# Patient Record
Sex: MALE | Race: BLACK OR AFRICAN AMERICAN | Employment: FULL TIME | URBAN - METROPOLITAN AREA
[De-identification: names, ages, dates, MRNs, and addresses within clinical notes are randomized per-mention and may not be internally consistent; named-entity substitution may affect disease eponyms.]

---

## 2017-01-11 ENCOUNTER — OFFICE VISIT (OUTPATIENT)
Dept: INTERNAL MEDICINE CLINIC | Age: 52
End: 2017-01-11

## 2017-01-11 VITALS
DIASTOLIC BLOOD PRESSURE: 80 MMHG | HEIGHT: 67 IN | SYSTOLIC BLOOD PRESSURE: 128 MMHG | WEIGHT: 173 LBS | OXYGEN SATURATION: 98 % | BODY MASS INDEX: 27.15 KG/M2 | RESPIRATION RATE: 16 BRPM | HEART RATE: 61 BPM | TEMPERATURE: 98.6 F

## 2017-01-11 DIAGNOSIS — I10 ESSENTIAL HYPERTENSION, BENIGN: ICD-10-CM

## 2017-01-11 DIAGNOSIS — L72.3 SEBACEOUS CYST: Primary | ICD-10-CM

## 2017-01-11 RX ORDER — SULFAMETHOXAZOLE AND TRIMETHOPRIM 800; 160 MG/1; MG/1
TABLET ORAL
Qty: 14 TAB | Refills: 0 | Status: SHIPPED | OUTPATIENT
Start: 2017-01-11 | End: 2017-04-27

## 2017-01-11 NOTE — PROGRESS NOTES
1. Have you been to the ER, urgent care clinic since your last visit? Hospitalized since your last visit? No    2. Have you seen or consulted any other health care providers outside of the [address] [place] [address] since your last visit? Include any pap smears or colon screening.  No

## 2017-01-11 NOTE — PROGRESS NOTES
HPI:   Pimple like lesion back x 2 weeks with mild pain but no fever or drainage    ROS is otherwise negative. Past Medical History   Diagnosis Date    Esophagitis     HTN (hypertension) 2/2016    IGT (impaired glucose tolerance)        Past Surgical History   Procedure Laterality Date    Hx colonoscopy  8/2015     polyps       Social History     Social History    Marital status:      Spouse name: N/A    Number of children: N/A    Years of education: N/A     Occupational History    Not on file. Social History Main Topics    Smoking status: Current Every Day Smoker     Packs/day: 0.25    Smokeless tobacco: Not on file    Alcohol use Yes      Comment: socially    Drug use: Not on file    Sexual activity: Not on file     Other Topics Concern    Not on file     Social History Narrative       No Known Allergies    Family History   Problem Relation Age of Onset    Arthritis-osteo Father        Current Outpatient Prescriptions   Medication Sig Dispense Refill    amLODIPine (NORVASC) 5 mg tablet Take 1 Tab by mouth daily. 30 Tab 5    omeprazole (PRILOSEC) 20 mg capsule Take 20 mg by mouth daily.  sildenafil citrate (VIAGRA) 100 mg tablet Take 100 mg by mouth as needed. Visit Vitals    /80 (BP 1 Location: Left arm, BP Patient Position: Sitting)    Pulse 61    Temp 98.6 °F (37 °C) (Tympanic)    Resp 16    Ht 5' 7\" (1.702 m)    Wt 173 lb (78.5 kg)    SpO2 98%    BMI 27.1 kg/m2       PE  Well nourished in NAD  HEENT:   OP: clear. Neck: supple w/o mass or bruits. Chest: clear. CV: RRR w/o m,r,g; pulses intact. Abd: soft, NT, w/o HSM or mass. Ext: w/o edema. Back: 1x2 cm indurated cystic lesion mid central back with minimal drainage  Neuro: NF. Assessment and Plan    Encounter Diagnoses   Name Primary?     Sebaceous cyst Yes    Essential hypertension, benign    Infected sebaceous cyst (back)  Bactrim DS 1 bid for 7 days  Keep clean with soap/water  F/U worsening or unimproved 7 days  HTN - controlled  Discussed the patient's above normal BMI with him. I have recommended the following interventions: dietary management education, guidance, and counseling . The BMI follow up plan is as follows: BMI is out of normal parameters and plan is as follows: I have counseled this patient on diet and exercise regimens   The patient was counseled on the dangers of tobacco use, and was advised to quit. Reviewed strategies to maximize success, including removing cigarettes and smoking materials from environment.   OV 6 mos or prn  I have explained plan to patient and the patient verbalizes understanding

## 2017-01-11 NOTE — PATIENT INSTRUCTIONS

## 2017-04-26 RX ORDER — AMLODIPINE BESYLATE 5 MG/1
5 TABLET ORAL DAILY
Qty: 30 TAB | Refills: 5 | Status: SHIPPED | OUTPATIENT
Start: 2017-04-26 | End: 2018-06-01 | Stop reason: SDUPTHER

## 2017-04-26 NOTE — TELEPHONE ENCOUNTER
Requested Prescriptions     Pending Prescriptions Disp Refills    amLODIPine (NORVASC) 5 mg tablet 30 Tab 5     Sig: Take 1 Tab by mouth daily.

## 2017-05-09 ENCOUNTER — OFFICE VISIT (OUTPATIENT)
Dept: INTERNAL MEDICINE CLINIC | Age: 52
End: 2017-05-09

## 2017-05-09 VITALS
HEART RATE: 69 BPM | TEMPERATURE: 97.8 F | RESPIRATION RATE: 16 BRPM | HEIGHT: 67 IN | BODY MASS INDEX: 26.84 KG/M2 | WEIGHT: 171 LBS | DIASTOLIC BLOOD PRESSURE: 80 MMHG | OXYGEN SATURATION: 98 % | SYSTOLIC BLOOD PRESSURE: 136 MMHG

## 2017-05-09 DIAGNOSIS — I10 ESSENTIAL HYPERTENSION, BENIGN: ICD-10-CM

## 2017-05-09 DIAGNOSIS — Z00.00 ROUTINE GENERAL MEDICAL EXAMINATION AT A HEALTH CARE FACILITY: Primary | ICD-10-CM

## 2017-05-09 NOTE — PATIENT INSTRUCTIONS

## 2017-05-09 NOTE — PROGRESS NOTES
HPI:   Routine check up  Hx notable for HTN  w/o chest pain/abd. discomfort; no dyspnea, cough or pedal edema; denies constitutional complaints of fever, night sweats or wt loss; no evidence of GI/ hemorrhage; no polyuria/polydipsia. Activity is age appropriate. ROS is otherwise negative. Past Medical History:   Diagnosis Date    Esophagitis     HTN (hypertension) 2/2016    IGT (impaired glucose tolerance)        Past Surgical History:   Procedure Laterality Date    HX COLONOSCOPY  8/2015    polyps       Social History     Social History    Marital status:      Spouse name: N/A    Number of children: N/A    Years of education: N/A     Occupational History    Not on file. Social History Main Topics    Smoking status: Current Every Day Smoker     Packs/day: 0.25    Smokeless tobacco: Not on file    Alcohol use Yes      Comment: socially    Drug use: Not on file    Sexual activity: Not on file     Other Topics Concern    Not on file     Social History Narrative       No Known Allergies    Family History   Problem Relation Age of Onset    Arthritis-osteo Father        Current Outpatient Prescriptions   Medication Sig Dispense Refill    amLODIPine (NORVASC) 5 mg tablet Take 1 Tab by mouth daily. 30 Tab 5    omeprazole (PRILOSEC) 20 mg capsule Take 20 mg by mouth daily as needed.  sildenafil citrate (VIAGRA) 100 mg tablet Take 100 mg by mouth as needed. Visit Vitals    /80    Pulse 69    Temp 97.8 °F (36.6 °C) (Tympanic)    Resp 16    Ht 5' 7\" (1.702 m)    Wt 171 lb (77.6 kg)    SpO2 98%    BMI 26.78 kg/m2       PE  Well nourished in NAD  HEENT: OP: clear. Neck: supple w/o mass or bruits. Chest: clear. CV: RRR w/o m,r,g; pulses intact. Abd: soft, NT, w/o HSM or mass. Rectal: heme neg; prostate 3 FBS and smooth  Ext: w/o edema. Neuro: NF.  ECG: [id number]    Assessment and Plan    Encounter Diagnoses   Name Primary?     Routine general medical examination at a health care facility Yes    Essential hypertension, benign    HTN - controlled  Labs soon to assess metabolic parameters  [place] done 8/2015 (polyp)  I have reviewed/discussed the above normal BMI with the patient. I have recommended the following interventions: dietary management education, guidance, and counseling . The patient was counseled on the dangers of tobacco use, and was advised to quit. Reviewed strategies to maximize success, including removing cigarettes and smoking materials from environment.   No change in rx  OV 6 mos or prn  I have explained plan to patient and the patient verbalizes understanding

## 2017-05-11 ENCOUNTER — LAB ONLY (OUTPATIENT)
Dept: INTERNAL MEDICINE CLINIC | Age: 52
End: 2017-05-11

## 2017-05-11 ENCOUNTER — HOSPITAL ENCOUNTER (OUTPATIENT)
Dept: LAB | Age: 52
Discharge: HOME | End: 2017-05-11
Payer: COMMERCIAL

## 2017-05-11 DIAGNOSIS — Z00.00 ROUTINE GENERAL MEDICAL EXAMINATION AT A HEALTH CARE FACILITY: ICD-10-CM

## 2017-05-11 DIAGNOSIS — I10 ESSENTIAL HYPERTENSION, BENIGN: ICD-10-CM

## 2017-05-11 LAB
ALBUMIN SERPL BCP-MCNC: 4 G/DL (ref 3.4–5)
ALBUMIN/GLOB SERPL: 1.1 {RATIO} (ref 0.8–1.7)
ALP SERPL-CCNC: 115 U/L (ref 45–117)
ALT SERPL-CCNC: 33 U/L (ref 16–61)
ANION GAP BLD CALC-SCNC: 9 MMOL/L (ref 3–18)
AST SERPL W P-5'-P-CCNC: 25 U/L (ref 15–37)
BASOPHILS # BLD AUTO: 0 K/UL (ref 0–0.06)
BASOPHILS # BLD: 0 % (ref 0–2)
BILIRUB SERPL-MCNC: 0.7 MG/DL (ref 0.2–1)
BUN SERPL-MCNC: 12 MG/DL (ref 7–18)
BUN/CREAT SERPL: 10 (ref 12–20)
CALCIUM SERPL-MCNC: 9.4 MG/DL (ref 8.5–10.1)
CHLORIDE SERPL-SCNC: 105 MMOL/L (ref 100–108)
CHOLEST SERPL-MCNC: 158 MG/DL
CO2 SERPL-SCNC: 27 MMOL/L (ref 21–32)
CREAT SERPL-MCNC: 1.16 MG/DL (ref 0.6–1.3)
DIFFERENTIAL METHOD BLD: ABNORMAL
EOSINOPHIL # BLD: 0.7 K/UL (ref 0–0.4)
EOSINOPHIL NFR BLD: 11 % (ref 0–5)
ERYTHROCYTE [DISTWIDTH] IN BLOOD BY AUTOMATED COUNT: 13.9 % (ref 11.6–14.5)
GLOBULIN SER CALC-MCNC: 3.6 G/DL (ref 2–4)
GLUCOSE SERPL-MCNC: 91 MG/DL (ref 74–99)
HCT VFR BLD AUTO: 53.1 % (ref 36–48)
HDLC SERPL-MCNC: 53 MG/DL (ref 40–60)
HDLC SERPL: 3 {RATIO} (ref 0–5)
HGB BLD-MCNC: 19 G/DL (ref 13–16)
LDLC SERPL CALC-MCNC: 79.4 MG/DL (ref 0–100)
LIPID PROFILE,FLP: NORMAL
LYMPHOCYTES # BLD AUTO: 32 % (ref 21–52)
LYMPHOCYTES # BLD: 2 K/UL (ref 0.9–3.6)
MCH RBC QN AUTO: 32.3 PG (ref 24–34)
MCHC RBC AUTO-ENTMCNC: 35.8 G/DL (ref 31–37)
MCV RBC AUTO: 90.3 FL (ref 74–97)
MONOCYTES # BLD: 0.4 K/UL (ref 0.05–1.2)
MONOCYTES NFR BLD AUTO: 6 % (ref 3–10)
NEUTS SEG # BLD: 3.2 K/UL (ref 1.8–8)
NEUTS SEG NFR BLD AUTO: 51 % (ref 40–73)
PLATELET # BLD AUTO: 188 K/UL (ref 135–420)
PMV BLD AUTO: 10.9 FL (ref 9.2–11.8)
POTASSIUM SERPL-SCNC: 4.4 MMOL/L (ref 3.5–5.5)
PROT SERPL-MCNC: 7.6 G/DL (ref 6.4–8.2)
PSA SERPL-MCNC: 0.7 NG/ML (ref 0–4)
RBC # BLD AUTO: 5.88 M/UL (ref 4.7–5.5)
SODIUM SERPL-SCNC: 141 MMOL/L (ref 136–145)
TRIGL SERPL-MCNC: 128 MG/DL (ref ?–150)
VLDLC SERPL CALC-MCNC: 25.6 MG/DL
WBC # BLD AUTO: 6.2 K/UL (ref 4.6–13.2)

## 2017-05-11 PROCEDURE — 80061 LIPID PANEL: CPT | Performed by: INTERNAL MEDICINE

## 2017-05-11 PROCEDURE — 85025 COMPLETE CBC W/AUTO DIFF WBC: CPT | Performed by: INTERNAL MEDICINE

## 2017-05-11 PROCEDURE — 80053 COMPREHEN METABOLIC PANEL: CPT | Performed by: INTERNAL MEDICINE

## 2017-05-11 PROCEDURE — 84153 ASSAY OF PSA TOTAL: CPT | Performed by: INTERNAL MEDICINE

## 2018-01-31 ENCOUNTER — HOSPITAL ENCOUNTER (OUTPATIENT)
Dept: LAB | Age: 53
Discharge: HOME | End: 2018-01-31
Payer: COMMERCIAL

## 2018-01-31 ENCOUNTER — OFFICE VISIT (OUTPATIENT)
Dept: INTERNAL MEDICINE CLINIC | Age: 53
End: 2018-01-31

## 2018-01-31 VITALS
SYSTOLIC BLOOD PRESSURE: 134 MMHG | DIASTOLIC BLOOD PRESSURE: 80 MMHG | RESPIRATION RATE: 16 BRPM | WEIGHT: 177 LBS | BODY MASS INDEX: 27.78 KG/M2 | HEIGHT: 67 IN | TEMPERATURE: 99 F | HEART RATE: 56 BPM | OXYGEN SATURATION: 95 %

## 2018-01-31 DIAGNOSIS — Z23 ENCOUNTER FOR IMMUNIZATION: ICD-10-CM

## 2018-01-31 DIAGNOSIS — I10 ESSENTIAL HYPERTENSION, BENIGN: ICD-10-CM

## 2018-01-31 DIAGNOSIS — N34.2 URETHRITIS: ICD-10-CM

## 2018-01-31 DIAGNOSIS — N34.2 URETHRITIS: Primary | ICD-10-CM

## 2018-01-31 PROCEDURE — 87491 CHLMYD TRACH DNA AMP PROBE: CPT | Performed by: INTERNAL MEDICINE

## 2018-01-31 RX ORDER — CEFTRIAXONE 250 MG/8ML
150 INJECTION, POWDER, FOR SOLUTION INTRAMUSCULAR; INTRAVENOUS ONCE
Qty: 150 MG | Refills: 0
Start: 2018-01-31 | End: 2018-01-31

## 2018-01-31 RX ORDER — DOXYCYCLINE 100 MG/1
TABLET ORAL
Qty: 14 TAB | Refills: 0 | Status: SHIPPED | OUTPATIENT
Start: 2018-01-31 | End: 2018-03-12

## 2018-01-31 NOTE — PROGRESS NOTES
Beulah Duggan presents today for Ceftriaxone 125 mg  injection. Allergies reviewed. Injection given in right gluteus per Dr Ramirez Chimera verbal order, order read back and confirmed Ceftriaxone 125 mg x 1 dose. Patient tolerated injection well was observed in office for 15 mins and left office ambulatory.

## 2018-01-31 NOTE — PATIENT INSTRUCTIONS
DASH Diet: Care Instructions  Your Care Instructions    The DASH diet is an eating plan that can help lower your blood pressure. DASH stands for Dietary Approaches to Stop Hypertension. Hypertension is high blood pressure. The DASH diet focuses on eating foods that are high in calcium, potassium, and magnesium. These nutrients can lower blood pressure. The foods that are highest in these nutrients are fruits, vegetables, low-fat dairy products, nuts, seeds, and legumes. But taking calcium, potassium, and magnesium supplements instead of eating foods that are high in those nutrients does not have the same effect. The DASH diet also includes whole grains, fish, and poultry. The DASH diet is one of several lifestyle changes your doctor may recommend to lower your high blood pressure. Your doctor may also want you to decrease the amount of sodium in your diet. Lowering sodium while following the DASH diet can lower blood pressure even further than just the DASH diet alone. Follow-up care is a key part of your treatment and safety. Be sure to make and go to all appointments, and call your doctor if you are having problems. It's also a good idea to know your test results and keep a list of the medicines you take. How can you care for yourself at home? Following the DASH diet  · Eat 4 to 5 servings of fruit each day. A serving is 1 medium-sized piece of fruit, ½ cup chopped or canned fruit, 1/4 cup dried fruit, or 4 ounces (½ cup) of fruit juice. Choose fruit more often than fruit juice. · Eat 4 to 5 servings of vegetables each day. A serving is 1 cup of lettuce or raw leafy vegetables, ½ cup of chopped or cooked vegetables, or 4 ounces (½ cup) of vegetable juice. Choose vegetables more often than vegetable juice. · Get 2 to 3 servings of low-fat and fat-free dairy each day. A serving is 8 ounces of milk, 1 cup of yogurt, or 1 ½ ounces of cheese. · Eat 6 to 8 servings of grains each day.  A serving is 1 slice of bread, 1 ounce of dry cereal, or ½ cup of cooked rice, pasta, or cooked cereal. Try to choose whole-grain products as much as possible. · Limit lean meat, poultry, and fish to 2 servings each day. A serving is 3 ounces, about the size of a deck of cards. · Eat 4 to 5 servings of nuts, seeds, and legumes (cooked dried beans, lentils, and split peas) each week. A serving is 1/3 cup of nuts, 2 tablespoons of seeds, or ½ cup of cooked beans or peas. · Limit fats and oils to 2 to 3 servings each day. A serving is 1 teaspoon of vegetable oil or 2 tablespoons of salad dressing. · Limit sweets and added sugars to 5 servings or less a week. A serving is 1 tablespoon jelly or jam, ½ cup sorbet, or 1 cup of lemonade. · Eat less than 2,300 milligrams (mg) of sodium a day. If you limit your sodium to 1,500 mg a day, you can lower your blood pressure even more. Tips for success  · Start small. Do not try to make dramatic changes to your diet all at once. You might feel that you are missing out on your favorite foods and then be more likely to not follow the plan. Make small changes, and stick with them. Once those changes become habit, add a few more changes. · Try some of the following:  ¨ Make it a goal to eat a fruit or vegetable at every meal and at snacks. This will make it easy to get the recommended amount of fruits and vegetables each day. ¨ Try yogurt topped with fruit and nuts for a snack or healthy dessert. ¨ Add lettuce, tomato, cucumber, and onion to sandwiches. ¨ Combine a ready-made pizza crust with low-fat mozzarella cheese and lots of vegetable toppings. Try using tomatoes, squash, spinach, broccoli, carrots, cauliflower, and onions. ¨ Have a variety of cut-up vegetables with a low-fat dip as an appetizer instead of chips and dip. ¨ Sprinkle sunflower seeds or chopped almonds over salads. Or try adding chopped walnuts or almonds to cooked vegetables.   ¨ Try some vegetarian meals using beans and peas. Add garbanzo or kidney beans to salads. Make burritos and tacos with mashed reynoso beans or black beans. Where can you learn more? Go to http://glen-vanessa.info/. Enter N848 in the search box to learn more about \"DASH Diet: Care Instructions. \"  Current as of: September 21, 2016  Content Version: 11.4  © 0650-9052 [employer]. Care instructions adapted under license by [employer] (which disclaims liability or warranty for this information). If you have questions about a medical condition or this instruction, always ask your healthcare professional. [address] any warranty or liability for your use of this information. Body Mass Index: Care Instructions  Your Care Instructions    Body mass index (BMI) can help you see if your weight is raising your risk for health problems. It uses a formula to compare how much you weigh with how tall you are. · A BMI lower than 18.5 is considered underweight. · A BMI between 18.5 and 24.9 is considered healthy. · A BMI between 25 and 29.9 is considered overweight. A BMI of 30 or higher is considered obese. If your BMI is in the normal range, it means that you have a lower risk for weight-related health problems. If your BMI is in the overweight or obese range, you may be at increased risk for weight-related health problems, such as high blood pressure, heart disease, stroke, arthritis or joint pain, and diabetes. If your BMI is in the underweight range, you may be at increased risk for health problems such as fatigue, lower protection (immunity) against illness, muscle loss, bone loss, hair loss, and hormone problems. BMI is just one measure of your risk for weight-related health problems. You may be at higher risk for health problems if you are not active, you eat an unhealthy diet, or you drink too much alcohol or use tobacco products.   Follow-up care is a key part of your treatment and safety. Be sure to make and go to all appointments, and call your doctor if you are having problems. It's also a good idea to know your test results and keep a list of the medicines you take. How can you care for yourself at home? · Practice healthy eating habits. This includes eating plenty of fruits, vegetables, whole grains, lean protein, and low-fat dairy. · If your doctor recommends it, get more exercise. Walking is a good choice. Bit by bit, increase the amount you walk every day. Try for at least 30 minutes on most days of the week. · Do not smoke. Smoking can increase your risk for health problems. If you need help quitting, talk to your doctor about stop-smoking programs and medicines. These can increase your chances of quitting for good. · Limit alcohol to 2 drinks a day for men and 1 drink a day for women. Too much alcohol can cause health problems. If you have a BMI higher than 25  · Your doctor may do other tests to check your risk for weight-related health problems. This may include measuring the distance around your waist. A waist measurement of more than 40 inches in men or 35 inches in women can increase the risk of weight-related health problems. · Talk with your doctor about steps you can take to stay healthy or improve your health. You may need to make lifestyle changes to lose weight and stay healthy, such as changing your diet and getting regular exercise. If you have a BMI lower than 18.5  · Your doctor may do other tests to check your risk for health problems. · Talk with your doctor about steps you can take to stay healthy or improve your health. You may need to make lifestyle changes to gain or maintain weight and stay healthy, such as getting more healthy foods in your diet and doing exercises to build muscle. Where can you learn more? Go to http://glen-vanessa.info/.   Enter S176 in the search box to learn more about \"Body Mass Index: Care Instructions. \"  Current as of: October 13, 2016  Content Version: 11.4  © 6764-4529 [employer], [facility]. Care instructions adapted under license by [employer] (which disclaims liability or warranty for this information). If you have questions about a medical condition or this instruction, always ask your healthcare professional. [place] [address] any warranty or liability for your use of this information.

## 2018-01-31 NOTE — PROGRESS NOTES
HPI:   Unprotected sex 8 days ago with female partner  3 days of painful penile d/c w/o rash, ulcers or fever    ROS is otherwise negative. Past Medical History:   Diagnosis Date    Esophagitis     HTN (hypertension) 2/2016    IGT (impaired glucose tolerance)        Past Surgical History:   Procedure Laterality Date    HX COLONOSCOPY  8/2015    polyps       Social History     Social History    Marital status:      Spouse name: N/A    Number of children: N/A    Years of education: N/A     Occupational History    Not on file. Social History Main Topics    Smoking status: Current Every Day Smoker     Packs/day: 0.25    Smokeless tobacco: Never Used    Alcohol use Yes      Comment: socially    Drug use: Not on file    Sexual activity: Not on file     Other Topics Concern    Not on file     Social History Narrative       No Known Allergies    Family History   Problem Relation Age of Onset    Arthritis-osteo Father        Current Outpatient Prescriptions   Medication Sig Dispense Refill    amLODIPine (NORVASC) 5 mg tablet Take 1 Tab by mouth daily. 30 Tab 5    omeprazole (PRILOSEC) 20 mg capsule Take 20 mg by mouth daily as needed.  sildenafil citrate (VIAGRA) 100 mg tablet Take 100 mg by mouth as needed. Visit Vitals    /80 (BP 1 Location: Left arm, BP Patient Position: Sitting)    Pulse (!) 56    Temp 99 °F (37.2 °C) (Tympanic)    Resp 16    Ht 5' 7\" (1.702 m)    Wt 177 lb (80.3 kg)    SpO2 95%    BMI 27.72 kg/m2       PE  Well nourished in NAD  HEENT:   OP: clear. Neck: supple w/o mass or bruits. Chest: clear. CV: RRR w/o m,r,g; pulses intact. Abd: soft, NT, w/o HSM or mass. GO: no ulcers; testes nl; +penile d/c  Ext: w/o edema. Neuro: NF. Assessment and Plan    Encounter Diagnoses   Name Primary?     Urethritis Yes    Essential hypertension, benign     Encounter for immunization      Probable GC/chlamydia urethritis  Rocephin 125 mg now (IM); doxycycline 100 mg bid for 7 days  Abstain from sex x 14 days; urged safe sex practices  HTN - controlled  Flu vaccine given  The patient was counseled on the dangers of tobacco use, and was advised to quit. Reviewed strategies to maximize success, including removing cigarettes and smoking materials from environment. F/U 5/2018 or prn  I have explained plan to patient and the patient verbalizes understanding        Discussed the patient's BMI with him. The BMI follow up plan is as follows:     dietary management education, guidance, and counseling  encourage exercise  monitor weight  prescribed dietary intake    An After Visit Summary was printed and given to the patient.

## 2018-01-31 NOTE — PROGRESS NOTES
1. Have you been to the ER, urgent care clinic since your last visit? Hospitalized since your last visit? No    2. Have you seen or consulted any other health care providers outside of the [address] [place] [address] since your last visit? Include any pap smears or colon screening.  No

## 2018-02-01 ENCOUNTER — TELEPHONE (OUTPATIENT)
Dept: INTERNAL MEDICINE CLINIC | Age: 53
End: 2018-02-01

## 2018-02-01 NOTE — TELEPHONE ENCOUNTER
[facility] contacted office to inform Dr Marilee Meek that swab used for GC/chlamidia was incorrect swab and cannot be run. New sample needs to be sent with correct swab.

## 2018-03-13 ENCOUNTER — OFFICE VISIT (OUTPATIENT)
Dept: INTERNAL MEDICINE CLINIC | Age: 53
End: 2018-03-13

## 2018-03-13 VITALS
SYSTOLIC BLOOD PRESSURE: 138 MMHG | RESPIRATION RATE: 16 BRPM | HEIGHT: 67 IN | BODY MASS INDEX: 27.62 KG/M2 | OXYGEN SATURATION: 98 % | TEMPERATURE: 98.2 F | HEART RATE: 63 BPM | DIASTOLIC BLOOD PRESSURE: 80 MMHG | WEIGHT: 176 LBS

## 2018-03-13 DIAGNOSIS — M54.6 ACUTE RIGHT-SIDED THORACIC BACK PAIN: ICD-10-CM

## 2018-03-13 DIAGNOSIS — I10 ESSENTIAL HYPERTENSION, BENIGN: ICD-10-CM

## 2018-03-13 DIAGNOSIS — G45.9 TRANSIENT CEREBRAL ISCHEMIA, UNSPECIFIED TYPE: Primary | ICD-10-CM

## 2018-03-13 NOTE — PROGRESS NOTES
1. Have you been to the ER, urgent care clinic since your last visit? Hospitalized since your last visit? Yes When: march 11 Where: [facility] Reason for visit: back pain    2. Have you seen or consulted any other health care providers outside of the [facility] since your last visit? Include any pap smears or colon screening.  No

## 2018-03-13 NOTE — PATIENT INSTRUCTIONS
DASH Diet: Care Instructions  Your Care Instructions    The DASH diet is an eating plan that can help lower your blood pressure. DASH stands for Dietary Approaches to Stop Hypertension. Hypertension is high blood pressure. The DASH diet focuses on eating foods that are high in calcium, potassium, and magnesium. These nutrients can lower blood pressure. The foods that are highest in these nutrients are fruits, vegetables, low-fat dairy products, nuts, seeds, and legumes. But taking calcium, potassium, and magnesium supplements instead of eating foods that are high in those nutrients does not have the same effect. The DASH diet also includes whole grains, fish, and poultry. The DASH diet is one of several lifestyle changes your doctor may recommend to lower your high blood pressure. Your doctor may also want you to decrease the amount of sodium in your diet. Lowering sodium while following the DASH diet can lower blood pressure even further than just the DASH diet alone. Follow-up care is a key part of your treatment and safety. Be sure to make and go to all appointments, and call your doctor if you are having problems. It's also a good idea to know your test results and keep a list of the medicines you take. How can you care for yourself at home? Following the DASH diet  · Eat 4 to 5 servings of fruit each day. A serving is 1 medium-sized piece of fruit, ½ cup chopped or canned fruit, 1/4 cup dried fruit, or 4 ounces (½ cup) of fruit juice. Choose fruit more often than fruit juice. · Eat 4 to 5 servings of vegetables each day. A serving is 1 cup of lettuce or raw leafy vegetables, ½ cup of chopped or cooked vegetables, or 4 ounces (½ cup) of vegetable juice. Choose vegetables more often than vegetable juice. · Get 2 to 3 servings of low-fat and fat-free dairy each day. A serving is 8 ounces of milk, 1 cup of yogurt, or 1 ½ ounces of cheese. · Eat 6 to 8 servings of grains each day.  A serving is 1 slice of bread, 1 ounce of dry cereal, or ½ cup of cooked rice, pasta, or cooked cereal. Try to choose whole-grain products as much as possible. · Limit lean meat, poultry, and fish to 2 servings each day. A serving is 3 ounces, about the size of a deck of cards. · Eat 4 to 5 servings of nuts, seeds, and legumes (cooked dried beans, lentils, and split peas) each week. A serving is 1/3 cup of nuts, 2 tablespoons of seeds, or ½ cup of cooked beans or peas. · Limit fats and oils to 2 to 3 servings each day. A serving is 1 teaspoon of vegetable oil or 2 tablespoons of salad dressing. · Limit sweets and added sugars to 5 servings or less a week. A serving is 1 tablespoon jelly or jam, ½ cup sorbet, or 1 cup of lemonade. · Eat less than 2,300 milligrams (mg) of sodium a day. If you limit your sodium to 1,500 mg a day, you can lower your blood pressure even more. Tips for success  · Start small. Do not try to make dramatic changes to your diet all at once. You might feel that you are missing out on your favorite foods and then be more likely to not follow the plan. Make small changes, and stick with them. Once those changes become habit, add a few more changes. · Try some of the following:  ¨ Make it a goal to eat a fruit or vegetable at every meal and at snacks. This will make it easy to get the recommended amount of fruits and vegetables each day. ¨ Try yogurt topped with fruit and nuts for a snack or healthy dessert. ¨ Add lettuce, tomato, cucumber, and onion to sandwiches. ¨ Combine a ready-made pizza crust with low-fat mozzarella cheese and lots of vegetable toppings. Try using tomatoes, squash, spinach, broccoli, carrots, cauliflower, and onions. ¨ Have a variety of cut-up vegetables with a low-fat dip as an appetizer instead of chips and dip. ¨ Sprinkle sunflower seeds or chopped almonds over salads. Or try adding chopped walnuts or almonds to cooked vegetables.   ¨ Try some vegetarian meals using beans and peas. Add garbanzo or kidney beans to salads. Make burritos and tacos with mashed reynoso beans or black beans. Where can you learn more? Go to http://glen-vanessa.info/. Enter J163 in the search box to learn more about \"DASH Diet: Care Instructions. \"  Current as of: September 21, 2016  Content Version: 11.4  © 3404-2896 [employer]. Care instructions adapted under license by [employer] (which disclaims liability or warranty for this information). If you have questions about a medical condition or this instruction, always ask your healthcare professional. [address] any warranty or liability for your use of this information. Body Mass Index: Care Instructions  Your Care Instructions    Body mass index (BMI) can help you see if your weight is raising your risk for health problems. It uses a formula to compare how much you weigh with how tall you are. · A BMI lower than 18.5 is considered underweight. · A BMI between 18.5 and 24.9 is considered healthy. · A BMI between 25 and 29.9 is considered overweight. A BMI of 30 or higher is considered obese. If your BMI is in the normal range, it means that you have a lower risk for weight-related health problems. If your BMI is in the overweight or obese range, you may be at increased risk for weight-related health problems, such as high blood pressure, heart disease, stroke, arthritis or joint pain, and diabetes. If your BMI is in the underweight range, you may be at increased risk for health problems such as fatigue, lower protection (immunity) against illness, muscle loss, bone loss, hair loss, and hormone problems. BMI is just one measure of your risk for weight-related health problems. You may be at higher risk for health problems if you are not active, you eat an unhealthy diet, or you drink too much alcohol or use tobacco products.   Follow-up care is a key part of your treatment and safety. Be sure to make and go to all appointments, and call your doctor if you are having problems. It's also a good idea to know your test results and keep a list of the medicines you take. How can you care for yourself at home? · Practice healthy eating habits. This includes eating plenty of fruits, vegetables, whole grains, lean protein, and low-fat dairy. · If your doctor recommends it, get more exercise. Walking is a good choice. Bit by bit, increase the amount you walk every day. Try for at least 30 minutes on most days of the week. · Do not smoke. Smoking can increase your risk for health problems. If you need help quitting, talk to your doctor about stop-smoking programs and medicines. These can increase your chances of quitting for good. · Limit alcohol to 2 drinks a day for men and 1 drink a day for women. Too much alcohol can cause health problems. If you have a BMI higher than 25  · Your doctor may do other tests to check your risk for weight-related health problems. This may include measuring the distance around your waist. A waist measurement of more than 40 inches in men or 35 inches in women can increase the risk of weight-related health problems. · Talk with your doctor about steps you can take to stay healthy or improve your health. You may need to make lifestyle changes to lose weight and stay healthy, such as changing your diet and getting regular exercise. If you have a BMI lower than 18.5  · Your doctor may do other tests to check your risk for health problems. · Talk with your doctor about steps you can take to stay healthy or improve your health. You may need to make lifestyle changes to gain or maintain weight and stay healthy, such as getting more healthy foods in your diet and doing exercises to build muscle. Where can you learn more? Go to http://glen-vanessa.info/.   Enter S176 in the search box to learn more about \"Body Mass Index: Care Instructions. \"  Current as of: October 13, 2016  Content Version: 11.4  © 3641-9270 Healthwise, [facility]. Care instructions adapted under license by [employer] (which disclaims liability or warranty for this information). If you have questions about a medical condition or this instruction, always ask your healthcare professional. [place] [address] any warranty or liability for your use of this information.

## 2018-03-13 NOTE — PROGRESS NOTES
HPI:   ER 2 days ago post straining mid back (R) trapezius area) while working - pain is improving with NSAID/muscle relaxant given by ER  No cough/SOB  Vague (L) facial/arm numbness occurring at same time as back strain; no weakness/speech disturbance, HA or diplopia    ROS is otherwise negative. Past Medical History:   Diagnosis Date    Esophagitis     HTN (hypertension) 2/2016    IGT (impaired glucose tolerance)        Past Surgical History:   Procedure Laterality Date    HX COLONOSCOPY  8/2015    polyps       Social History     Social History    Marital status:      Spouse name: N/A    Number of children: N/A    Years of education: N/A     Occupational History    Not on file. Social History Main Topics    Smoking status: Current Every Day Smoker     Packs/day: 0.25    Smokeless tobacco: Never Used    Alcohol use Yes      Comment: socially    Drug use: Not on file    Sexual activity: Not on file     Other Topics Concern    Not on file     Social History Narrative       No Known Allergies    Family History   Problem Relation Age of Onset    Arthritis-osteo Father        Current Outpatient Prescriptions   Medication Sig Dispense Refill    methocarbamol (ROBAXIN) 500 mg tablet Take 2 Tabs by mouth four (4) times daily. 24 Tab 0    ibuprofen (MOTRIN) 400 mg tablet Take 1 Tab by mouth every six (6) hours as needed for Pain. 20 Tab 0    amLODIPine (NORVASC) 5 mg tablet Take 1 Tab by mouth daily. 30 Tab 5    sildenafil citrate (VIAGRA) 100 mg tablet Take 100 mg by mouth as needed. Visit Vitals    /80 (BP 1 Location: Left arm, BP Patient Position: Sitting)    Pulse 63    Temp 98.2 °F (36.8 °C) (Tympanic)    Resp 16    Ht 5' 7\" (1.702 m)    Wt 176 lb (79.8 kg)    SpO2 98%    BMI 27.57 kg/m2       PE  Well nourished in NAD  HEENT:  OP: clear. Neck: supple w/o mass or bruits. Chest: clear. CV: RRR w/o m,r,g; pulses intact. Abd: soft, NT, w/o HSM or mass.   Ext: w/o edema. Back: (R) parathoracic  tenderness w/o rash  Neuro: NF. Assessment and Plan    Encounter Diagnoses   Name Primary?  Transient cerebral ischemia, unspecified type Yes    Essential hypertension, benign     Acute right-sided thoracic back pain        ? TIA - head CT/carotid PVL ordered; to ER if worsens  Trapezius strain - improving on NSAID/robaxin; f/u worsening or unimproved 7 days  HTN - controlled  The patient was counseled on the dangers of tobacco use, and was advised to quit. Reviewed strategies to maximize success, including removing cigarettes and smoking materials from environment. OV 3 mos or prn  I have explained plan to patient and the patient verbalizes understanding        Discussed the patient's BMI with him. The BMI follow up plan is as follows:     dietary management education, guidance, and counseling  encourage exercise  monitor weight  prescribed dietary intake    An After Visit Summary was printed and given to the patient.

## 2018-03-13 NOTE — LETTER
NOTIFICATION RETURN TO WORK / SCHOOL 
 
3/13/2018 4:00 PM 
 
Mr. Olu Vincent 
[address] 234 beds at the [facility] [address] [facility] [address] To Whom It May Concern: 
 
Olu Vincent is currently under the care of  Sophie Krishnamurthy. He will return to work/school on: March 15, 2018 If there are questions or concerns please have the patient contact our office.  
 
 
 
Sincerely, 
 
 
 
Darren Lagunas MD

## 2018-03-23 ENCOUNTER — HOSPITAL ENCOUNTER (OUTPATIENT)
Dept: CT IMAGING | Age: 53
Discharge: HOME | End: 2018-03-23
Attending: INTERNAL MEDICINE
Payer: COMMERCIAL

## 2018-03-23 ENCOUNTER — HOSPITAL ENCOUNTER (OUTPATIENT)
Dept: VASCULAR SURGERY | Age: 53
Discharge: HOME | End: 2018-03-23
Attending: INTERNAL MEDICINE
Payer: COMMERCIAL

## 2018-03-23 DIAGNOSIS — G45.9 TRANSIENT CEREBRAL ISCHEMIA, UNSPECIFIED TYPE: ICD-10-CM

## 2018-03-23 PROCEDURE — 70450 CT HEAD/BRAIN W/O DYE: CPT

## 2018-03-23 PROCEDURE — 93880 EXTRACRANIAL BILAT STUDY: CPT

## 2018-03-23 NOTE — PROCEDURES
Ethan [facility]  *** FINAL REPORT ***    Name: Kiran Barr  MRN: [id number]    Outpatient  : 1965  HIS Order #: [id number]  [address] [facility] Visit #: [id number]  Date: 23 Mar 2018    TYPE OF TEST: Cerebrovascular Duplex    REASON FOR TEST    Right Carotid:-             Proximal               Mid                 Distal  cm/s  Systolic  Diastolic  Systolic  Diastolic  Systolic  Diastolic  CCA:    377.4      20.0      102.0      20.0       61.0      12.0  Bulb:  ECA:     47.0  ICA:     45.0      14.0       43.0      14.0       90.0      25.0  ICA/CCA:  0.8       1.3    ICA Stenosis: <50%    Right Vertebral:-  Finding: Antegrade  Sys:       50.0  Adrianne:       13.0    Right Subclavian: Normal    Left Carotid:-            Proximal                Mid                 Distal  cm/s  Systolic  Diastolic  Systolic  Diastolic  Systolic  Diastolic  CCA:    168.3      18.0       89.0      22.0       65.0      16.0  Bulb:  ECA:     47.0       0.0  ICA:     47.0      12.0       65.0      23.0       76.0      23.0  ICA/CCA:  0.6       1.3    ICA Stenosis: <50%    Left Vertebral:-  Finding: Antegrade  Sys:       57.0  Adrianne:       22.0    Left Subclavian: Normal    INTERPRETATION/FINDINGS  Duplex images were obtained using 2-D gray scale, color flow, and  spectral Doppler analysis. 1. Bilateral <50% stenosis of the internal carotid arteries. 2. No significant stenosis in the external carotid arteries  bilaterally. 3. Antegrade flow in both vertebral arteries. 4. Normal flow in both subclavian arteries. Plaque Morphology: 1. Mild hypoechoic plaque in the bulb and right ICA. 2.Mild hyperechoic plaque in the bulb and left ICA. ADDITIONAL COMMENTS    I have personally reviewed the data relevant to the interpretation of  this  study. TECHNOLOGIST: Latanya Aelx, [facility], RVT/  Signed: 2018 10:21 AM    PHYSICIAN: Lea Henderson.  Jovita Cuevas MD  Signed: 2018 11:50 AM

## 2018-06-01 RX ORDER — AMLODIPINE BESYLATE 5 MG/1
5 TABLET ORAL DAILY
Qty: 30 TAB | Refills: 5 | Status: SHIPPED | OUTPATIENT
Start: 2018-06-01 | End: 2018-10-23 | Stop reason: SDUPTHER

## 2018-06-01 RX ORDER — SILDENAFIL 100 MG/1
100 TABLET, FILM COATED ORAL AS NEEDED
Qty: 30 TAB | Refills: 2 | Status: SHIPPED | OUTPATIENT
Start: 2018-06-01 | End: 2019-12-10 | Stop reason: ALTCHOICE

## 2018-06-01 NOTE — TELEPHONE ENCOUNTER
Requested Prescriptions     Pending Prescriptions Disp Refills    amLODIPine (NORVASC) 5 mg tablet 30 Tab 5     Sig: Take 1 Tab by mouth daily.  sildenafil citrate (VIAGRA) 100 mg tablet       Sig: Take 1 Tab by mouth as needed.

## 2018-10-23 ENCOUNTER — OFFICE VISIT (OUTPATIENT)
Dept: INTERNAL MEDICINE CLINIC | Age: 53
End: 2018-10-23

## 2018-10-23 VITALS
TEMPERATURE: 98.4 F | DIASTOLIC BLOOD PRESSURE: 80 MMHG | RESPIRATION RATE: 16 BRPM | HEIGHT: 67 IN | OXYGEN SATURATION: 99 % | BODY MASS INDEX: 27.31 KG/M2 | HEART RATE: 57 BPM | SYSTOLIC BLOOD PRESSURE: 138 MMHG | WEIGHT: 174 LBS

## 2018-10-23 DIAGNOSIS — I10 ESSENTIAL HYPERTENSION, BENIGN: ICD-10-CM

## 2018-10-23 DIAGNOSIS — M54.50 ACUTE BILATERAL LOW BACK PAIN WITHOUT SCIATICA: Primary | ICD-10-CM

## 2018-10-23 DIAGNOSIS — Z23 ENCOUNTER FOR IMMUNIZATION: ICD-10-CM

## 2018-10-23 RX ORDER — AMLODIPINE BESYLATE 5 MG/1
5 TABLET ORAL DAILY
Qty: 90 TAB | Refills: 0 | Status: SHIPPED | OUTPATIENT
Start: 2018-10-23 | End: 2019-01-21 | Stop reason: SDUPTHER

## 2018-10-23 RX ORDER — NAPROXEN 500 MG/1
TABLET ORAL
Qty: 28 TAB | Refills: 0 | Status: SHIPPED | OUTPATIENT
Start: 2018-10-23 | End: 2019-07-01

## 2018-10-23 NOTE — PATIENT INSTRUCTIONS
DASH Diet: Care Instructions Your Care Instructions The DASH diet is an eating plan that can help lower your blood pressure. DASH stands for Dietary Approaches to Stop Hypertension. Hypertension is high blood pressure. The DASH diet focuses on eating foods that are high in calcium, potassium, and magnesium. These nutrients can lower blood pressure. The foods that are highest in these nutrients are fruits, vegetables, low-fat dairy products, nuts, seeds, and legumes. But taking calcium, potassium, and magnesium supplements instead of eating foods that are high in those nutrients does not have the same effect. The DASH diet also includes whole grains, fish, and poultry. The DASH diet is one of several lifestyle changes your doctor may recommend to lower your high blood pressure. Your doctor may also want you to decrease the amount of sodium in your diet. Lowering sodium while following the DASH diet can lower blood pressure even further than just the DASH diet alone. Follow-up care is a key part of your treatment and safety. Be sure to make and go to all appointments, and call your doctor if you are having problems. It's also a good idea to know your test results and keep a list of the medicines you take. How can you care for yourself at home? Following the DASH diet · Eat 4 to 5 servings of fruit each day. A serving is 1 medium-sized piece of fruit, ½ cup chopped or canned fruit, 1/4 cup dried fruit, or 4 ounces (½ cup) of fruit juice. Choose fruit more often than fruit juice. · Eat 4 to 5 servings of vegetables each day. A serving is 1 cup of lettuce or raw leafy vegetables, ½ cup of chopped or cooked vegetables, or 4 ounces (½ cup) of vegetable juice. Choose vegetables more often than vegetable juice. · Get 2 to 3 servings of low-fat and fat-free dairy each day. A serving is 8 ounces of milk, 1 cup of yogurt, or 1 ½ ounces of cheese. · Eat 6 to 8 servings of grains each day. A serving is 1 slice of bread, 1 ounce of dry cereal, or ½ cup of cooked rice, pasta, or cooked cereal. Try to choose whole-grain products as much as possible. · Limit lean meat, poultry, and fish to 2 servings each day. A serving is 3 ounces, about the size of a deck of cards. · Eat 4 to 5 servings of nuts, seeds, and legumes (cooked dried beans, lentils, and split peas) each week. A serving is 1/3 cup of nuts, 2 tablespoons of seeds, or ½ cup of cooked beans or peas. · Limit fats and oils to 2 to 3 servings each day. A serving is 1 teaspoon of vegetable oil or 2 tablespoons of salad dressing. · Limit sweets and added sugars to 5 servings or less a week. A serving is 1 tablespoon jelly or jam, ½ cup sorbet, or 1 cup of lemonade. · Eat less than 2,300 milligrams (mg) of sodium a day. If you limit your sodium to 1,500 mg a day, you can lower your blood pressure even more. Tips for success · Start small. Do not try to make dramatic changes to your diet all at once. You might feel that you are missing out on your favorite foods and then be more likely to not follow the plan. Make small changes, and stick with them. Once those changes become habit, add a few more changes. · Try some of the following: ? Make it a goal to eat a fruit or vegetable at every meal and at snacks. This will make it easy to get the recommended amount of fruits and vegetables each day. ? Try yogurt topped with fruit and nuts for a snack or healthy dessert. ? Add lettuce, tomato, cucumber, and onion to sandwiches. ? Combine a ready-made pizza crust with low-fat mozzarella cheese and lots of vegetable toppings. Try using tomatoes, squash, spinach, broccoli, carrots, cauliflower, and onions. ? Have a variety of cut-up vegetables with a low-fat dip as an appetizer instead of chips and dip. ? Sprinkle sunflower seeds or chopped almonds over salads.  Or try adding chopped walnuts or almonds to cooked vegetables. ? Try some vegetarian meals using beans and peas. Add garbanzo or kidney beans to salads. Make burritos and tacos with mashed reynoso beans or black beans. Where can you learn more? Go to http://glen-vanessa.info/. Enter S774 in the search box to learn more about \"DASH Diet: Care Instructions. \" Current as of: December 6, 2017 Content Version: 11.8 © 0612-8108 [employer]. Care instructions adapted under license by [employer] (which disclaims liability or warranty for this information). If you have questions about a medical condition or this instruction, always ask your healthcare professional. Katherin [place] any warranty or liability for your use of this information. Body Mass Index: Care Instructions Your Care Instructions Body mass index (BMI) can help you see if your weight is raising your risk for health problems. It uses a formula to compare how much you weigh with how tall you are. · A BMI lower than 18.5 is considered underweight. · A BMI between 18.5 and 24.9 is considered healthy. · A BMI between 25 and 29.9 is considered overweight. A BMI of 30 or higher is considered obese. If your BMI is in the normal range, it means that you have a lower risk for weight-related health problems. If your BMI is in the overweight or obese range, you may be at increased risk for weight-related health problems, such as high blood pressure, heart disease, stroke, arthritis or joint pain, and diabetes. If your BMI is in the underweight range, you may be at increased risk for health problems such as fatigue, lower protection (immunity) against illness, muscle loss, bone loss, hair loss, and hormone problems. BMI is just one measure of your risk for weight-related health problems.  You may be at higher risk for health problems if you are not active, you eat an unhealthy diet, or you drink too much alcohol or use tobacco products. Follow-up care is a key part of your treatment and safety. Be sure to make and go to all appointments, and call your doctor if you are having problems. It's also a good idea to know your test results and keep a list of the medicines you take. How can you care for yourself at home? · Practice healthy eating habits. This includes eating plenty of fruits, vegetables, whole grains, lean protein, and low-fat dairy. · If your doctor recommends it, get more exercise. Walking is a good choice. Bit by bit, increase the amount you walk every day. Try for at least 30 minutes on most days of the week. · Do not smoke. Smoking can increase your risk for health problems. If you need help quitting, talk to your doctor about stop-smoking programs and medicines. These can increase your chances of quitting for good. · Limit alcohol to 2 drinks a day for men and 1 drink a day for women. Too much alcohol can cause health problems. If you have a BMI higher than 25 · Your doctor may do other tests to check your risk for weight-related health problems. This may include measuring the distance around your waist. A waist measurement of more than 40 inches in men or 35 inches in women can increase the risk of weight-related health problems. · Talk with your doctor about steps you can take to stay healthy or improve your health. You may need to make lifestyle changes to lose weight and stay healthy, such as changing your diet and getting regular exercise. If you have a BMI lower than 18.5 · Your doctor may do other tests to check your risk for health problems. · Talk with your doctor about steps you can take to stay healthy or improve your health. You may need to make lifestyle changes to gain or maintain weight and stay healthy, such as getting more healthy foods in your diet and doing exercises to build muscle. Where can you learn more? Go to http://glen-vanessa.info/. Enter S176 in the search box to learn more about \"Body Mass Index: Care Instructions. \" Current as of: October 13, 2016 Content Version: 11.4 © 9047-3868 [employer]. Care instructions adapted under license by [employer] (which disclaims liability or warranty for this information). If you have questions about a medical condition or this instruction, always ask your healthcare professional. [address] any warranty or liability for your use of this information.

## 2018-10-23 NOTE — PROGRESS NOTES
1. Have you been to the ER, urgent care clinic since your last visit? Hospitalized since your last visit? No 
 
2. Have you seen or consulted any other health care providers outside of the [address] [place] [address] since your last visit? Include any pap smears or colon screening.  No
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
## 2019-01-21 RX ORDER — AMLODIPINE BESYLATE 5 MG/1
TABLET ORAL
Qty: 90 TAB | Refills: 0 | Status: SHIPPED | OUTPATIENT
Start: 2019-01-21 | End: 2019-03-12 | Stop reason: SDUPTHER

## 2019-03-11 DIAGNOSIS — Z00.00 ROUTINE GENERAL MEDICAL EXAMINATION AT A HEALTH CARE FACILITY: Primary | ICD-10-CM

## 2019-03-12 ENCOUNTER — OFFICE VISIT (OUTPATIENT)
Dept: INTERNAL MEDICINE CLINIC | Age: 54
End: 2019-03-12

## 2019-03-12 VITALS
WEIGHT: 174 LBS | TEMPERATURE: 98.5 F | OXYGEN SATURATION: 99 % | DIASTOLIC BLOOD PRESSURE: 80 MMHG | HEIGHT: 67 IN | BODY MASS INDEX: 27.31 KG/M2 | HEART RATE: 68 BPM | SYSTOLIC BLOOD PRESSURE: 138 MMHG | RESPIRATION RATE: 16 BRPM

## 2019-03-12 DIAGNOSIS — Z00.00 ROUTINE GENERAL MEDICAL EXAMINATION AT A HEALTH CARE FACILITY: Primary | ICD-10-CM

## 2019-03-12 DIAGNOSIS — R73.02 IGT (IMPAIRED GLUCOSE TOLERANCE): ICD-10-CM

## 2019-03-12 DIAGNOSIS — I10 ESSENTIAL HYPERTENSION, BENIGN: ICD-10-CM

## 2019-03-12 RX ORDER — AMLODIPINE BESYLATE 5 MG/1
TABLET ORAL
Qty: 90 TAB | Refills: 3 | Status: SHIPPED | OUTPATIENT
Start: 2019-03-12 | End: 2019-12-10 | Stop reason: SDUPTHER

## 2019-03-12 NOTE — PROGRESS NOTES
HPI:   Check up  Hx notable for HTN/IGT  No acute problems; requests hep C/HIV testing as well  w/o chest pain/abd. discomfort; no dyspnea, cough or pedal edema; denies constitutional complaints of fever, night sweats or wt loss; no evidence of GI/ hemorrhage; no polyuria/polydipsia. Activity is age appropriate. ROS is otherwise negative. Past Medical History:   Diagnosis Date    Esophagitis     HTN (hypertension) 2/2016    IGT (impaired glucose tolerance)        Past Surgical History:   Procedure Laterality Date    HX COLONOSCOPY  8/2015    polyps       Social History     Socioeconomic History    Marital status:      Spouse name: Not on file    Number of children: Not on file    Years of education: Not on file    Highest education level: Not on file   Social Needs    Financial resource strain: Not on file    Food insecurity - worry: Not on file    Food insecurity - inability: Not on file    Transportation needs - medical: Not on file   [employer] needs - non-medical: Not on file   Occupational History    Not on file   Tobacco Use    Smoking status: Current Every Day Smoker     Packs/day: 0.25    Smokeless tobacco: Never Used   Substance and Sexual Activity    Alcohol use: Yes     Comment: socially    Drug use: Not on file    Sexual activity: Not on file   Other Topics Concern    Not on file   Social History Narrative    Not on file       No Known Allergies    Family History   Problem Relation Age of Onset    Arthritis-osteo Father        Current Outpatient Medications   Medication Sig Dispense Refill    amLODIPine (NORVASC) 5 mg tablet TAKE 1 TABLET BY MOUTH EVERY DAY 90 Tab 0    naproxen (NAPROSYN) 500 mg tablet 1 bid with food for 14 days 28 Tab 0    sildenafil citrate (VIAGRA) 100 mg tablet Take 1 Tab by mouth as needed.  30 Tab 2           Visit Vitals  /80 (BP 1 Location: Left arm, BP Patient Position: Sitting)   Pulse 68   Temp 98.5 °F (36.9 °C) (Tympanic) Resp 16   Ht 5' 7\" (1.702 m)   Wt 174 lb (78.9 kg)   SpO2 99%   BMI 27.25 kg/m²       PE  Well nourished in NAD  HEENT:   OP: clear. Neck: supple w/o mass or bruits. Chest: clear. CV: RRR w/o m,r,g; pulses intact. Abd: soft, NT, w/o HSM or mass. Rectal: heme neg; prostate 3 FBS and smooth  Ext: w/o edema. Neuro: NF. Assessment and Plan    Encounter Diagnoses   Name Primary?  Routine general medical examination at a health care facility Yes    Essential hypertension, benign     IGT (impaired glucose tolerance)        BP @ goal  Labs to assess metabolic parameters (IGT)  Continue dietary/exercise efforts  [place] 8/2015 (polyps)  No change in rx  OV 6 mos or prn  I have explained plan to patient and the patient verbalizes understanding        Discussed the patient's BMI with him. The BMI follow up plan is as follows:     dietary management education, guidance, and counseling  encourage exercise  monitor weight  prescribed dietary intake    An After Visit Summary was printed and given to the patient.

## 2019-03-12 NOTE — PATIENT INSTRUCTIONS
DASH Diet: Care Instructions  Your Care Instructions    The DASH diet is an eating plan that can help lower your blood pressure. DASH stands for Dietary Approaches to Stop Hypertension. Hypertension is high blood pressure. The DASH diet focuses on eating foods that are high in calcium, potassium, and magnesium. These nutrients can lower blood pressure. The foods that are highest in these nutrients are fruits, vegetables, low-fat dairy products, nuts, seeds, and legumes. But taking calcium, potassium, and magnesium supplements instead of eating foods that are high in those nutrients does not have the same effect. The DASH diet also includes whole grains, fish, and poultry. The DASH diet is one of several lifestyle changes your doctor may recommend to lower your high blood pressure. Your doctor may also want you to decrease the amount of sodium in your diet. Lowering sodium while following the DASH diet can lower blood pressure even further than just the DASH diet alone. Follow-up care is a key part of your treatment and safety. Be sure to make and go to all appointments, and call your doctor if you are having problems. It's also a good idea to know your test results and keep a list of the medicines you take. How can you care for yourself at home? Following the DASH diet  · Eat 4 to 5 servings of fruit each day. A serving is 1 medium-sized piece of fruit, ½ cup chopped or canned fruit, 1/4 cup dried fruit, or 4 ounces (½ cup) of fruit juice. Choose fruit more often than fruit juice. · Eat 4 to 5 servings of vegetables each day. A serving is 1 cup of lettuce or raw leafy vegetables, ½ cup of chopped or cooked vegetables, or 4 ounces (½ cup) of vegetable juice. Choose vegetables more often than vegetable juice. · Get 2 to 3 servings of low-fat and fat-free dairy each day. A serving is 8 ounces of milk, 1 cup of yogurt, or 1 ½ ounces of cheese. · Eat 6 to 8 servings of grains each day.  A serving is 1 slice of bread, 1 ounce of dry cereal, or ½ cup of cooked rice, pasta, or cooked cereal. Try to choose whole-grain products as much as possible. · Limit lean meat, poultry, and fish to 2 servings each day. A serving is 3 ounces, about the size of a deck of cards. · Eat 4 to 5 servings of nuts, seeds, and legumes (cooked dried beans, lentils, and split peas) each week. A serving is 1/3 cup of nuts, 2 tablespoons of seeds, or ½ cup of cooked beans or peas. · Limit fats and oils to 2 to 3 servings each day. A serving is 1 teaspoon of vegetable oil or 2 tablespoons of salad dressing. · Limit sweets and added sugars to 5 servings or less a week. A serving is 1 tablespoon jelly or jam, ½ cup sorbet, or 1 cup of lemonade. · Eat less than 2,300 milligrams (mg) of sodium a day. If you limit your sodium to 1,500 mg a day, you can lower your blood pressure even more. Tips for success  · Start small. Do not try to make dramatic changes to your diet all at once. You might feel that you are missing out on your favorite foods and then be more likely to not follow the plan. Make small changes, and stick with them. Once those changes become habit, add a few more changes. · Try some of the following:  ? Make it a goal to eat a fruit or vegetable at every meal and at snacks. This will make it easy to get the recommended amount of fruits and vegetables each day. ? Try yogurt topped with fruit and nuts for a snack or healthy dessert. ? Add lettuce, tomato, cucumber, and onion to sandwiches. ? Combine a ready-made pizza crust with low-fat mozzarella cheese and lots of vegetable toppings. Try using tomatoes, squash, spinach, broccoli, carrots, cauliflower, and onions. ? Have a variety of cut-up vegetables with a low-fat dip as an appetizer instead of chips and dip. ? Sprinkle sunflower seeds or chopped almonds over salads. Or try adding chopped walnuts or almonds to cooked vegetables.   ? Try some vegetarian meals using beans and peas. Add garbanzo or kidney beans to salads. Make burritos and tacos with mashed reynoso beans or black beans. Where can you learn more? Go to http://glen-vanessa.info/. Enter M934 in the search box to learn more about \"DASH Diet: Care Instructions. \"  Current as of: July 22, 2018  Content Version: 11.9  © 8965-7127 [employer]. Care instructions adapted under license by [employer] (which disclaims liability or warranty for this information). If you have questions about a medical condition or this instruction, always ask your healthcare professional. [address] any warranty or liability for your use of this information. Body Mass Index: Care Instructions  Your Care Instructions    Body mass index (BMI) can help you see if your weight is raising your risk for health problems. It uses a formula to compare how much you weigh with how tall you are. · A BMI lower than 18.5 is considered underweight. · A BMI between 18.5 and 24.9 is considered healthy. · A BMI between 25 and 29.9 is considered overweight. A BMI of 30 or higher is considered obese. If your BMI is in the normal range, it means that you have a lower risk for weight-related health problems. If your BMI is in the overweight or obese range, you may be at increased risk for weight-related health problems, such as high blood pressure, heart disease, stroke, arthritis or joint pain, and diabetes. If your BMI is in the underweight range, you may be at increased risk for health problems such as fatigue, lower protection (immunity) against illness, muscle loss, bone loss, hair loss, and hormone problems. BMI is just one measure of your risk for weight-related health problems. You may be at higher risk for health problems if you are not active, you eat an unhealthy diet, or you drink too much alcohol or use tobacco products. Follow-up care is a key part of your treatment and safety. Be sure to make and go to all appointments, and call your doctor if you are having problems. It's also a good idea to know your test results and keep a list of the medicines you take. How can you care for yourself at home? · Practice healthy eating habits. This includes eating plenty of fruits, vegetables, whole grains, lean protein, and low-fat dairy. · If your doctor recommends it, get more exercise. Walking is a good choice. Bit by bit, increase the amount you walk every day. Try for at least 30 minutes on most days of the week. · Do not smoke. Smoking can increase your risk for health problems. If you need help quitting, talk to your doctor about stop-smoking programs and medicines. These can increase your chances of quitting for good. · Limit alcohol to 2 drinks a day for men and 1 drink a day for women. Too much alcohol can cause health problems. If you have a BMI higher than 25  · Your doctor may do other tests to check your risk for weight-related health problems. This may include measuring the distance around your waist. A waist measurement of more than 40 inches in men or 35 inches in women can increase the risk of weight-related health problems. · Talk with your doctor about steps you can take to stay healthy or improve your health. You may need to make lifestyle changes to lose weight and stay healthy, such as changing your diet and getting regular exercise. If you have a BMI lower than 18.5  · Your doctor may do other tests to check your risk for health problems. · Talk with your doctor about steps you can take to stay healthy or improve your health. You may need to make lifestyle changes to gain or maintain weight and stay healthy, such as getting more healthy foods in your diet and doing exercises to build muscle. Where can you learn more? Go to http://glen-vanessa.info/. Enter S176 in the search box to learn more about \"Body Mass Index: Care Instructions. \"  Current as of: October 13, 2016  Content Version: 11.4  © 9916-7842 [employer]. Care instructions adapted under license by [employer] (which disclaims liability or warranty for this information). If you have questions about a medical condition or this instruction, always ask your healthcare professional. [address] any warranty or liability for your use of this information.

## 2019-03-12 NOTE — PROGRESS NOTES
No chief complaint on file. Pt preferred language for health care discussion is english. Is someone accompanying this pt? no    Is the patient using any DME equipment during OV? no    Depression Screening:  3 most recent PHQ Screens 10/23/2018 5/9/2017 2/26/2016   Little interest or pleasure in doing things Not at all Not at all Not at all   Feeling down, depressed, irritable, or hopeless Not at all Not at all Not at all   Total Score PHQ 2 0 0 0       Learning Assessment:  Learning Assessment 2/26/2016   PRIMARY LEARNER Patient   HIGHEST LEVEL OF EDUCATION - PRIMARY LEARNER  SOME COLLEGE   BARRIERS PRIMARY LEARNER NONE   CO-LEARNER CAREGIVER No   PRIMARY LANGUAGE ENGLISH   LEARNER PREFERENCE PRIMARY DEMONSTRATION   ANSWERED BY patient   RELATIONSHIP SELF         Health Maintenance reviewed and discussed per provider. Yes        Advance Directive:  1. Do you have an advance directive in place? Patient Reply:no    2. If not, would you like material regarding how to put one in place? Patient Reply: no    Coordination of Care:  1. Have you been to the ER, urgent care clinic since your last visit? Hospitalized since your last visit? no    2. Have you seen or consulted any other health care providers outside of the [address] [place] [address] since your last visit? Include any pap smears or colon screening.  no    Provider prefers to do his own med reconciliation

## 2019-03-13 ENCOUNTER — HOSPITAL ENCOUNTER (OUTPATIENT)
Dept: LAB | Age: 54
Discharge: HOME | End: 2019-03-13
Payer: COMMERCIAL

## 2019-03-13 DIAGNOSIS — Z00.00 ROUTINE GENERAL MEDICAL EXAMINATION AT A HEALTH CARE FACILITY: ICD-10-CM

## 2019-03-13 LAB
ALBUMIN SERPL-MCNC: 4.1 G/DL (ref 3.4–5)
ALBUMIN/GLOB SERPL: 1.4 {RATIO} (ref 0.8–1.7)
ALP SERPL-CCNC: 110 U/L (ref 45–117)
ALT SERPL-CCNC: 28 U/L (ref 16–61)
ANION GAP SERPL CALC-SCNC: 7 MMOL/L (ref 3–18)
AST SERPL-CCNC: 17 U/L (ref 15–37)
BASOPHILS # BLD: 0 K/UL (ref 0–0.1)
BASOPHILS NFR BLD: 0 % (ref 0–2)
BILIRUB SERPL-MCNC: 0.5 MG/DL (ref 0.2–1)
BUN SERPL-MCNC: 8 MG/DL (ref 7–18)
BUN/CREAT SERPL: 8 (ref 12–20)
CALCIUM SERPL-MCNC: 9 MG/DL (ref 8.5–10.1)
CHLORIDE SERPL-SCNC: 105 MMOL/L (ref 100–108)
CHOLEST SERPL-MCNC: 141 MG/DL
CO2 SERPL-SCNC: 28 MMOL/L (ref 21–32)
CREAT SERPL-MCNC: 1.06 MG/DL (ref 0.6–1.3)
DIFFERENTIAL METHOD BLD: ABNORMAL
EOSINOPHIL # BLD: 0.8 K/UL (ref 0–0.4)
EOSINOPHIL NFR BLD: 8 % (ref 0–5)
ERYTHROCYTE [DISTWIDTH] IN BLOOD BY AUTOMATED COUNT: 13.7 % (ref 11.6–14.5)
EST. AVERAGE GLUCOSE BLD GHB EST-MCNC: 111 MG/DL
GLOBULIN SER CALC-MCNC: 2.9 G/DL (ref 2–4)
GLUCOSE SERPL-MCNC: 91 MG/DL (ref 74–99)
HBA1C MFR BLD: 5.5 % (ref 4.2–5.6)
HCT VFR BLD AUTO: 46.4 % (ref 36–48)
HDLC SERPL-MCNC: 46 MG/DL (ref 40–60)
HDLC SERPL: 3.1 {RATIO} (ref 0–5)
HGB BLD-MCNC: 16.2 G/DL (ref 13–16)
LDLC SERPL CALC-MCNC: 59.8 MG/DL (ref 0–100)
LIPID PROFILE,FLP: ABNORMAL
LYMPHOCYTES # BLD: 2 K/UL (ref 0.9–3.6)
LYMPHOCYTES NFR BLD: 22 % (ref 21–52)
MCH RBC QN AUTO: 31.5 PG (ref 24–34)
MCHC RBC AUTO-ENTMCNC: 34.9 G/DL (ref 31–37)
MCV RBC AUTO: 90.3 FL (ref 74–97)
MONOCYTES # BLD: 0.5 K/UL (ref 0.05–1.2)
MONOCYTES NFR BLD: 6 % (ref 3–10)
NEUTS SEG # BLD: 6 K/UL (ref 1.8–8)
NEUTS SEG NFR BLD: 64 % (ref 40–73)
PLATELET # BLD AUTO: 244 K/UL (ref 135–420)
PMV BLD AUTO: 10.2 FL (ref 9.2–11.8)
POTASSIUM SERPL-SCNC: 4.2 MMOL/L (ref 3.5–5.5)
PROT SERPL-MCNC: 7 G/DL (ref 6.4–8.2)
PSA SERPL-MCNC: 0.9 NG/ML (ref 0–4)
RBC # BLD AUTO: 5.14 M/UL (ref 4.7–5.5)
SODIUM SERPL-SCNC: 140 MMOL/L (ref 136–145)
TRIGL SERPL-MCNC: 176 MG/DL (ref ?–150)
VLDLC SERPL CALC-MCNC: 35.2 MG/DL
WBC # BLD AUTO: 9.3 K/UL (ref 4.6–13.2)

## 2019-03-13 PROCEDURE — 36415 COLL VENOUS BLD VENIPUNCTURE: CPT

## 2019-03-13 PROCEDURE — 84153 ASSAY OF PSA TOTAL: CPT

## 2019-03-13 PROCEDURE — 87389 HIV-1 AG W/HIV-1&-2 AB AG IA: CPT

## 2019-03-13 PROCEDURE — 80053 COMPREHEN METABOLIC PANEL: CPT

## 2019-03-13 PROCEDURE — 80061 LIPID PANEL: CPT

## 2019-03-13 PROCEDURE — 86803 HEPATITIS C AB TEST: CPT

## 2019-03-13 PROCEDURE — 85025 COMPLETE CBC W/AUTO DIFF WBC: CPT

## 2019-03-13 PROCEDURE — 83036 HEMOGLOBIN GLYCOSYLATED A1C: CPT

## 2019-03-14 LAB
HCV AB SER IA-ACNC: <0.02 INDEX
HCV AB SERPL QL IA: NEGATIVE
HCV COMMENT,HCGAC: NORMAL
HIV 1+2 AB+HIV1 P24 AG SERPL QL IA: NONREACTIVE
HIV12 RESULT COMMENT, HHIVC: NORMAL

## 2019-06-05 NOTE — PROGRESS NOTES
HPI:   Pt with HTN presents for f/u of perirectal abscess  Given flagyl/augmentin at [facility] 2 weeks ago for painful skin lesion (perirectal location); no drainage, fever and quickly resolved with rx    ROS is otherwise negative.     Past Medical History:   Diagnosis Date    Esophagitis     HTN (hypertension) 2/2016    IGT (impaired glucose tolerance)        Past Surgical History:   Procedure Laterality Date    HX COLONOSCOPY  8/2015    polyps       Social History     Socioeconomic History    Marital status:      Spouse name: Not on file    Number of children: Not on file    Years of education: Not on file    Highest education level: Not on file   Occupational History    Not on file   Social Needs    Financial resource strain: Not on file    Food insecurity:     Worry: Not on file     Inability: Not on file    Transportation needs:     Medical: Not on file     Non-medical: Not on file   Tobacco Use    Smoking status: Current Every Day Smoker     Packs/day: 0.25    Smokeless tobacco: Never Used   Substance and Sexual Activity    Alcohol use: Yes     Comment: socially    Drug use: Not on file    Sexual activity: Not on file   Lifestyle    Physical activity:     Days per week: Not on file     Minutes per session: Not on file    Stress: Not on file   Relationships    Social connections:     Talks on phone: Not on file     Gets together: Not on file     Attends Christian service: Not on file     Active member of club or organization: Not on file     Attends meetings of clubs or organizations: Not on file     Relationship status: Not on file    Intimate partner violence:     Fear of current or ex partner: Not on file     Emotionally abused: Not on file     Physically abused: Not on file     Forced sexual activity: Not on file   Other Topics Concern    Not on file   Social History Narrative    Not on file       No Known Allergies    Family History   Problem Relation Age of Onset    Arthritis-osteo Father        Current Outpatient Medications   Medication Sig Dispense Refill    amLODIPine (NORVASC) 5 mg tablet TAKE 1 TABLET BY MOUTH EVERY DAY 90 Tab 3    naproxen (NAPROSYN) 500 mg tablet 1 bid with food for 14 days 28 Tab 0    sildenafil citrate (VIAGRA) 100 mg tablet Take 1 Tab by mouth as needed. 30 Tab 2           Visit Vitals  /80 (BP 1 Location: Right arm, BP Patient Position: Sitting)   Pulse 65   Temp 98 °F (36.7 °C) (Tympanic)   Resp 16   Ht 5' 7\" (1.702 m)   Wt 171 lb (77.6 kg)   SpO2 95%   BMI 26.78 kg/m²       PE  Well nourished in NAD  HEENT:  OP: clear. Neck: supple w/o mass or bruits. Chest: clear. CV: RRR w/o m,r,g; pulses intact. Abd: soft, NT, w/o HSM or mass. Rectal: no evidence of abnormality  Ext: w/o edema. Neuro: NF. Assessment and Plan    Encounter Diagnoses   Name Primary?  Perirectal abscess Yes       ? Perirectal abscess - resolved with antibiotic rx; f/u recurrence  HTN - controlled  Stop smoking  OV 6 mos or prn  I have explained plan to patient and the patient verbalizes understanding    Discussed the patient's BMI with him. The BMI follow up plan is as follows:     dietary management education, guidance, and counseling  encourage exercise  monitor weight  prescribed dietary intake    An After Visit Summary was printed and given to the patient.

## 2019-06-11 ENCOUNTER — OFFICE VISIT (OUTPATIENT)
Dept: FAMILY MEDICINE CLINIC | Age: 54
End: 2019-06-11

## 2019-06-11 VITALS
SYSTOLIC BLOOD PRESSURE: 136 MMHG | HEART RATE: 65 BPM | HEIGHT: 67 IN | OXYGEN SATURATION: 95 % | BODY MASS INDEX: 26.84 KG/M2 | TEMPERATURE: 98 F | RESPIRATION RATE: 16 BRPM | WEIGHT: 171 LBS | DIASTOLIC BLOOD PRESSURE: 80 MMHG

## 2019-06-11 DIAGNOSIS — K61.1 PERIRECTAL ABSCESS: Primary | ICD-10-CM

## 2019-06-11 NOTE — PATIENT INSTRUCTIONS
Body Mass Index: Care Instructions  Your Care Instructions    Body mass index (BMI) can help you see if your weight is raising your risk for health problems. It uses a formula to compare how much you weigh with how tall you are. · A BMI lower than 18.5 is considered underweight. · A BMI between 18.5 and 24.9 is considered healthy. · A BMI between 25 and 29.9 is considered overweight. A BMI of 30 or higher is considered obese. If your BMI is in the normal range, it means that you have a lower risk for weight-related health problems. If your BMI is in the overweight or obese range, you may be at increased risk for weight-related health problems, such as high blood pressure, heart disease, stroke, arthritis or joint pain, and diabetes. If your BMI is in the underweight range, you may be at increased risk for health problems such as fatigue, lower protection (immunity) against illness, muscle loss, bone loss, hair loss, and hormone problems. BMI is just one measure of your risk for weight-related health problems. You may be at higher risk for health problems if you are not active, you eat an unhealthy diet, or you drink too much alcohol or use tobacco products. Follow-up care is a key part of your treatment and safety. Be sure to make and go to all appointments, and call your doctor if you are having problems. It's also a good idea to know your test results and keep a list of the medicines you take. How can you care for yourself at home? · Practice healthy eating habits. This includes eating plenty of fruits, vegetables, whole grains, lean protein, and low-fat dairy. · If your doctor recommends it, get more exercise. Walking is a good choice. Bit by bit, increase the amount you walk every day. Try for at least 30 minutes on most days of the week. · Do not smoke. Smoking can increase your risk for health problems. If you need help quitting, talk to your doctor about stop-smoking programs and medicines. These can increase your chances of quitting for good. · Limit alcohol to 2 drinks a day for men and 1 drink a day for women. Too much alcohol can cause health problems. If you have a BMI higher than 25  · Your doctor may do other tests to check your risk for weight-related health problems. This may include measuring the distance around your waist. A waist measurement of more than 40 inches in men or 35 inches in women can increase the risk of weight-related health problems. · Talk with your doctor about steps you can take to stay healthy or improve your health. You may need to make lifestyle changes to lose weight and stay healthy, such as changing your diet and getting regular exercise. If you have a BMI lower than 18.5  · Your doctor may do other tests to check your risk for health problems. · Talk with your doctor about steps you can take to stay healthy or improve your health. You may need to make lifestyle changes to gain or maintain weight and stay healthy, such as getting more healthy foods in your diet and doing exercises to build muscle. Where can you learn more? Go to http://glen-vanessa.info/. Enter S176 in the search box to learn more about \"Body Mass Index: Care Instructions. \"  Current as of: October 13, 2016  Content Version: 11.4  © 4331-1420 [employer]. Care instructions adapted under license by [employer] (which disclaims liability or warranty for this information). If you have questions about a medical condition or this instruction, always ask your healthcare professional. [place] [address] any warranty or liability for your use of this information. Perineal Abscess: Care Instructions  Your Care Instructions  A perineal abscess is an infection that causes a painful lump in the perineum. The perineum is the area between the scrotum and the anus in a man. In a woman, it's the area between the vulva and the anus.  The area may look red and feel painful and be swollen. The abscess may form after surgery or after delivery of a baby. It can also be caused by an infection of the prostate gland. The prostate gland is an organ found inside a man's body, just below the bladder. Your doctor may have done minor surgery to open and drain the abscess. You may have had a sedative to help you relax. You may be unsteady after having sedation. It can take a few hours for the medicine's effects to wear off. Common side effects include nausea, vomiting, and feeling sleepy or tired. The doctor has checked you carefully, but problems can develop later. If you notice any problems or new symptoms, get medical treatment right away. Follow-up care is a key part of your treatment and safety. Be sure to make and go to all appointments, and call your doctor if you are having problems. It's also a good idea to know your test results and keep a list of the medicines you take. How can you care for yourself at home? · If your doctor gave you a sedative:  ? For 24 hours, don't do anything that requires attention to detail. It takes time for the medicine's effects to completely wear off.  ? For your safety, do not drive or operate any machinery that could be dangerous. Wait until the medicine wears off. You need to be able to think clearly and react easily. · Be safe with medicines. Read and follow all instructions on the label. ? If the doctor gave you a prescription medicine for pain, take it as prescribed. ? If you are not taking a prescription pain medicine, ask your doctor if you can take an over-the-counter medicine. · If your doctor prescribed antibiotics, take them as directed. Do not stop taking them just because you feel better. You need to take the full course of antibiotics. · Sit in a few inches of warm water (sitz bath) 3 times a day and after bowel movements. The warm water helps the area heal and eases discomfort.   When should you call for help?  Call 911 anytime you think you may need emergency care. For example, call if:    · You have trouble breathing.     · You passed out (lost consciousness).    Call your doctor now or seek immediate medical care if:    · You have symptoms of infection, such as:  ? Increased pain, swelling, warmth, or redness. ? Red streaks leading from the area. ? Pus draining from the area. ? A fever.    Watch closely for changes in your health, and be sure to contact your doctor if:    · You do not get better as expected. Where can you learn more? Go to http://glen-vanessa.info/. Enter 96 [id number] in the search box to learn more about \"Perineal Abscess: Care Instructions. \"  Current as of: March 27, 2018  Content Version: 11.9  © 8668-6403 [employer]. Care instructions adapted under license by [employer] (which disclaims liability or warranty for this information). If you have questions about a medical condition or this instruction, always ask your healthcare professional. [address] any warranty or liability for your use of this information.

## 2019-06-11 NOTE — PROGRESS NOTES
Chief Complaint   Patient presents with   [facility]       Pt preferred language for health care discussion is english. Is someone accompanying this pt? no    Is the patient using any DME equipment during OV? no    Depression Screening:  3 most recent PHQ Screens 10/23/2018 5/9/2017 2/26/2016   Little interest or pleasure in doing things Not at all Not at all Not at all   Feeling down, depressed, irritable, or hopeless Not at all Not at all Not at all   Total Score PHQ 2 0 0 0       Learning Assessment:  Learning Assessment 2/26/2016   PRIMARY LEARNER Patient   HIGHEST LEVEL OF EDUCATION - PRIMARY LEARNER  SOME COLLEGE   BARRIERS PRIMARY LEARNER NONE   CO-LEARNER CAREGIVER No   PRIMARY LANGUAGE ENGLISH   LEARNER PREFERENCE PRIMARY DEMONSTRATION   ANSWERED BY patient   RELATIONSHIP SELF         Health Maintenance reviewed and discussed per provider. Yes        Advance Directive:  1. Do you have an advance directive in place? Patient Reply:no    2. If not, would you like material regarding how to put one in place? Patient Reply: no    Coordination of Care:  1. Have you been to the ER, urgent care clinic since your last visit? Hospitalized since your last visit? Yes patient first rectal pain    2. Have you seen or consulted any other health care providers outside of the [address] [place] [address] since your last visit? Include any pap smears or colon screening.  no    Provider prefers to do his own med reconciliation

## 2019-07-01 ENCOUNTER — TELEPHONE (OUTPATIENT)
Dept: FAMILY MEDICINE CLINIC | Age: 54
End: 2019-07-01

## 2019-07-01 RX ORDER — IBUPROFEN 800 MG/1
TABLET ORAL
Qty: 30 TAB | Refills: 1 | Status: SHIPPED | OUTPATIENT
Start: 2019-07-01 | End: 2021-06-10

## 2019-07-01 NOTE — TELEPHONE ENCOUNTER
Patient calling asking for a script for ibuprofen patient stated he hurt his back around his ribs on the left side. Patient stated he has had this before.      pls advise

## 2019-12-10 ENCOUNTER — OFFICE VISIT (OUTPATIENT)
Dept: FAMILY MEDICINE CLINIC | Age: 54
End: 2019-12-10

## 2019-12-10 VITALS
HEIGHT: 67 IN | DIASTOLIC BLOOD PRESSURE: 89 MMHG | SYSTOLIC BLOOD PRESSURE: 142 MMHG | OXYGEN SATURATION: 99 % | HEART RATE: 59 BPM | TEMPERATURE: 97 F | BODY MASS INDEX: 28 KG/M2 | WEIGHT: 178.4 LBS | RESPIRATION RATE: 18 BRPM

## 2019-12-10 DIAGNOSIS — Z12.5 PROSTATE CANCER SCREENING: ICD-10-CM

## 2019-12-10 DIAGNOSIS — Z11.3 SCREEN FOR STD (SEXUALLY TRANSMITTED DISEASE): ICD-10-CM

## 2019-12-10 DIAGNOSIS — I10 ESSENTIAL HYPERTENSION, BENIGN: ICD-10-CM

## 2019-12-10 DIAGNOSIS — Z76.89 ENCOUNTER TO ESTABLISH CARE: Primary | ICD-10-CM

## 2019-12-10 DIAGNOSIS — I73.9 CLAUDICATION OF LEFT LOWER EXTREMITY (HCC): ICD-10-CM

## 2019-12-10 DIAGNOSIS — Z72.0 TOBACCO ABUSE: ICD-10-CM

## 2019-12-10 DIAGNOSIS — Z23 ENCOUNTER FOR IMMUNIZATION: ICD-10-CM

## 2019-12-10 DIAGNOSIS — N52.9 ERECTILE DYSFUNCTION, UNSPECIFIED ERECTILE DYSFUNCTION TYPE: ICD-10-CM

## 2019-12-10 DIAGNOSIS — R35.0 URINARY FREQUENCY: ICD-10-CM

## 2019-12-10 LAB
BILIRUB UR QL STRIP: NEGATIVE
GLUCOSE UR-MCNC: NEGATIVE MG/DL
HBA1C MFR BLD HPLC: 5.7 %
KETONES P FAST UR STRIP-MCNC: NEGATIVE MG/DL
PH UR STRIP: 5.5 [PH] (ref 4.6–8)
PROT UR QL STRIP: NEGATIVE
SP GR UR STRIP: 1.01 (ref 1–1.03)
UA UROBILINOGEN AMB POC: NORMAL (ref 0.2–1)
URINALYSIS CLARITY POC: CLEAR
URINALYSIS COLOR POC: YELLOW
URINE BLOOD POC: NEGATIVE
URINE LEUKOCYTES POC: NEGATIVE
URINE NITRITES POC: NEGATIVE

## 2019-12-10 RX ORDER — AMLODIPINE BESYLATE 5 MG/1
TABLET ORAL
Qty: 90 TAB | Refills: 3 | Status: SHIPPED | OUTPATIENT
Start: 2019-12-10 | End: 2021-02-19 | Stop reason: SDUPTHER

## 2019-12-10 RX ORDER — VARENICLINE TARTRATE 1 MG/1
TABLET, FILM COATED ORAL
Qty: 60 TAB | Refills: 4 | Status: SHIPPED | OUTPATIENT
Start: 2019-12-10 | End: 2020-03-09

## 2019-12-10 RX ORDER — VARENICLINE TARTRATE 25 MG
KIT ORAL
Qty: 1 DOSE PACK | Refills: 0 | Status: SHIPPED | OUTPATIENT
Start: 2019-12-10 | End: 2020-07-06 | Stop reason: ALTCHOICE

## 2019-12-10 RX ORDER — SILDENAFIL 100 MG/1
TABLET, FILM COATED ORAL
Qty: 30 TAB | Refills: 5 | Status: SHIPPED | OUTPATIENT
Start: 2019-12-10 | End: 2020-12-27

## 2019-12-10 NOTE — PROGRESS NOTES
Chief Complaint   Patient presents with   [facility].     Pt concerned with left calf x 1 year on and off. Pt states gets tight at random and he will start limping at times. 1. Have you been to the ER, urgent care clinic since your last visit? Hospitalized since your last visit? No    2. Have you seen or consulted any other health care providers outside of the [address] [place] [address] since your last visit? Include any pap smears or colon screening. No     Pt accepts flu shot. Verbal Order with Readback given by Justin Serrano NP for Influenza. Given in Right Deltoid without difficulty.     Health Maintenance Due   Topic Date Due    Pneumococcal 0-64 years (1 of 1 - PPSV23) [birth year]/1971    Shingrix Vaccine Age 50> (1 of 2) 04/07/2015    Influenza Age 5 to Adult  08/01/2019

## 2019-12-10 NOTE — PATIENT INSTRUCTIONS
Vaccine Information Statement Influenza (Flu) Vaccine (Inactivated or Recombinant): What You Need to Know Many Vaccine Information Statements are available in Hungarian and other languages. See www.immunize.org/vis Hojas de información sobre vacunas están disponibles en español y en muchos otros idiomas. Visite www.immunize.org/vis 1. Why get vaccinated? Influenza vaccine can prevent influenza (flu). Flu is a contagious disease that spreads around the [facility] every year, usually between October and May. Anyone can get the flu, but it is more dangerous for some people. Infants and young children, people 72years of age and older, pregnant women, and people with certain health conditions or a weakened immune system are at greatest risk of flu complications. Pneumonia, bronchitis, sinus infections and ear infections are examples of flu-related complications. If you have a medical condition, such as heart disease, cancer or diabetes, flu can make it worse. Flu can cause fever and chills, sore throat, muscle aches, fatigue, cough, headache, and runny or stuffy nose. Some people may have vomiting and diarrhea, though this is more common in children than adults. Each year thousands of people in the [facility] die from flu, and many more are hospitalized. Flu vaccine prevents millions of illnesses and flu-related visits to the doctor each year. 2. Influenza vaccines CDC recommends everyone 10months of age and older get vaccinated every flu season. Children 6 months through 6years of age may need 2 doses during a single flu season. Everyone else needs only 1 dose each flu season. It takes about 2 weeks for protection to develop after vaccination. There are many flu viruses, and they are always changing. Each year a new flu vaccine is made to protect against three or four viruses that are likely to cause disease in the upcoming flu season.  Even when the vaccine doesnt exactly match these viruses, it may still provide some protection. Influenza vaccine does not cause flu. Influenza vaccine may be given at the same time as other vaccines. 3. Talk with your health care provider Tell your vaccine provider if the person getting the vaccine: 
 Has had an allergic reaction after a previous dose of influenza vaccine, or has any severe, life-threatening allergies.  Has ever had Guillain-Barré Syndrome (also called GBS). In some cases, your health care provider may decide to postpone influenza vaccination to a future visit. People with minor illnesses, such as a cold, may be vaccinated. People who are moderately or severely ill should usually wait until they recover before getting influenza vaccine. Your health care provider can give you more information. 4. Risks of a reaction  Soreness, redness, and swelling where shot is given, fever, muscle aches, and headache can happen after influenza vaccine.  There may be a very small increased risk of Guillain-Barré Syndrome (GBS) after inactivated influenza vaccine (the flu shot). [place] children who get the flu shot along with pneumococcal vaccine (PCV13), and/or DTaP vaccine at the same time might be slightly more likely to have a seizure caused by fever. Tell your health care provider if a child who is getting flu vaccine has ever had a seizure. People sometimes faint after medical procedures, including vaccination. Tell your provider if you feel dizzy or have vision changes or ringing in the ears. As with any medicine, there is a very remote chance of a vaccine causing a severe allergic reaction, other serious injury, or death. 5. What if there is a serious problem? An allergic reaction could occur after the vaccinated person leaves the clinic.  If you see signs of a severe allergic reaction (hives, swelling of the face and throat, difficulty breathing, a fast heartbeat, dizziness, or weakness), call 9-1-1 and get the person to the nearest hospital. 
 
For other signs that concern you, call your health care provider. Adverse reactions should be reported to the Vaccine Adverse Event Reporting System (VAERS). Your health care provider will usually file this report, or you can do it yourself. Visit the VAERS website at www.vaers. hhs.gov or call 7-336.430.6338. VAERS is only for reporting reactions, and VAERS staff do not give medical advice. 6. The National Vaccine Injury Compensation Program 
 
The [facility], [facility] Vaccine Injury Compensation Program (VICP) is a federal program that was created to compensate people who may have been injured by certain vaccines. Visit the VICP website at www.[facility].gov/vaccinecompensation or call 6-945.251.3590 to learn about the program and about filing a claim. There is a time limit to file a claim for compensation. 7. How can I learn more?  Ask your health care provider.  Call your local or state health department.  Contact the Centers for Disease Control and Prevention (CDC): 
- Call 7-770.483.5671 (2-734-BIQ-INFO) or 
- Visit CDCs influenza website at www.cdc.gov/flu Vaccine Information Statement (Interim) Inactivated Influenza Vaccine 8/15/2019 
42 DELMA [place] 175QN-10 Department of Health and [place] Centers for Disease Control and Prevention Office Use Only

## 2019-12-10 NOTE — PROGRESS NOTES
HISTORY OF PRESENT ILLNESS  Marco Patel is a 47 y.o. male. HPI  Patient comes in today to establish care. Hx HTN - taking amlodipine, but does not take every day. May take medication once weekly. Patient is also a smoker, smokes about 0.5 ppd. He quit in past for about 10 days. Has been urinating a lot. Having a lot of urgency as well. Weight fluctuates. Tends to gain weight in winter and loses in summer. Denies nocturia, sleeps pretty good. Denies dysuria, penile discharge or lesion. Complains of pain in left lower leg, located in posterior calf. States feels like muscle is tightening up/cramping during walking, resolves at rest.  Worse with walking on incline. Hx of smoking.   Last LDL normal in March 2019  No Known Allergies    Past Medical History:   Diagnosis Date    Esophagitis     HTN (hypertension) 2/2016    IGT (impaired glucose tolerance)        Past Surgical History:   Procedure Laterality Date    HX COLONOSCOPY  8/2015    polyps       Social History     Socioeconomic History    Marital status: UNKNOWN     Spouse name: Not on file    Number of children: Not on file    Years of education: Not on file    Highest education level: Not on file   Occupational History    Not on file   Social Needs    Financial resource strain: Not on file    Food insecurity:     Worry: Not on file     Inability: Not on file    Transportation needs:     Medical: Not on file     Non-medical: Not on file   Tobacco Use    Smoking status: Current Every Day Smoker     Packs/day: 0.50     Types: Cigarettes    Smokeless tobacco: Never Used   Substance and Sexual Activity    Alcohol use: Yes     Comment: socially    Drug use: Not Currently     Types: Marijuana    Sexual activity: Not Currently   Lifestyle    Physical activity:     Days per week: Not on file     Minutes per session: Not on file    Stress: Not on file   Relationships    Social connections:     Talks on phone: Not on file Gets together: Not on file     Attends Muslim service: Not on file     Active member of club or organization: Not on file     Attends meetings of clubs or organizations: Not on file     Relationship status: Not on file    Intimate partner violence:     Fear of current or ex partner: Not on file     Emotionally abused: Not on file     Physically abused: Not on file     Forced sexual activity: Not on file   Other Topics Concern    Not on file   Social History Narrative    2 biological (27yo son and 30y daughter)  and 1 stepson, age 28y . Works for [employer] as , part-time at [employer]. Family History   Problem Relation Age of Onset    Arthritis-osteo Father     Cataract Father     Other Mother 58    Stroke Mother     Diabetes Mother     Hypertension Mother     Hypertension Sister     Diabetes Brother     Kidney Disease Brother         on HD,  in late 58s    Heart Disease Brother         CABG,  in late 62s    Seizures Brother    [employer] Hypertension Sister     Hypertension Sister     Hypertension Sister        Current Outpatient Medications   Medication Sig    amLODIPine (NORVASC) 5 mg tablet TAKE 1 TABLET BY MOUTH EVERY DAY    sildenafil citrate (VIAGRA) 100 mg tablet Take one tablet by mouth once daily as needed, take 30-60 minutes prior to sex on empty stomach    varenicline (CHANTIX STARTER QI) 0.5 mg (11)- 1 mg (42) DsPk Per dose pack instructions    varenicline (CHANTIX CONTINUING MONTH BOX) 1 mg tablet Per dose pack instructions. Fill once completes starter pack    ibuprofen (MOTRIN) 800 mg tablet 1 tid with food prn pain     No current facility-administered medications for this visit. Review of Systems   Constitutional: Negative for chills, diaphoresis, fever, malaise/fatigue and weight loss. HENT: Negative for congestion, ear pain, sore throat and tinnitus. Eyes: Negative for blurred vision and double vision.    Respiratory: Negative for cough, sputum production, shortness of breath and wheezing. Cardiovascular: Negative for chest pain, palpitations and leg swelling. Gastrointestinal: Negative for abdominal pain, blood in stool, constipation, diarrhea, nausea and vomiting. Genitourinary: Positive for frequency and urgency. Negative for dysuria, flank pain and hematuria. Musculoskeletal: Positive for myalgias (left calf pain/muscle cramp with walking). Negative for back pain and joint pain. Skin: Negative. Neurological: Negative for dizziness, tingling, sensory change, speech change, focal weakness and headaches. Psychiatric/Behavioral: Negative for depression. The patient is not nervous/anxious and does not have insomnia. Vitals:    12/10/19 1446   BP: 142/89   Pulse: (!) 59   Resp: 18   Temp: 97 °F (36.1 °C)   TempSrc: Oral   SpO2: 99%   Weight: 178 lb 6.4 oz (80.9 kg)   Height: 5' 7\" (1.702 m)     Physical Exam  Constitutional:       Appearance: Normal appearance. He is well-developed. HENT:      Right Ear: Hearing, tympanic membrane, ear canal and external ear normal.      Left Ear: Hearing, tympanic membrane, ear canal and external ear normal.      Nose: Nose normal.      Mouth/Throat:      Pharynx: Uvula midline. Cardiovascular:      Rate and Rhythm: Normal rate and regular rhythm. Pulses:           Dorsalis pedis pulses are 2+ on the right side and 1+ on the left side. Heart sounds: Normal heart sounds. Comments: Skin on lower legs shiny, hairless  Pulmonary:      Effort: Pulmonary effort is normal.      Breath sounds: Normal breath sounds. Abdominal:      General: Bowel sounds are normal.      Palpations: Abdomen is soft. Tenderness: There is no tenderness.    Feet:      Comments: Diabetic foot exam:     Left: Intact sensation to monofilament 4/4 locations   Position sense intact   1+ DP pulse   No foot deformities  Right: Intact sensation to monofilament 4/4 locations   Position sense intact   1+ DP pulse   No foot deformities    Lymphadenopathy:      Head:      Right side of head: No submental, submandibular or tonsillar adenopathy. Left side of head: No submental, submandibular or tonsillar adenopathy. Cervical: No cervical adenopathy. Skin:     General: Skin is warm and dry. Neurological:      Mental Status: He is alert and oriented to person, place, and time. Psychiatric:         Behavior: Behavior normal. Behavior is cooperative. Thought Content: Thought content normal.         Judgment: Judgment normal.       ASSESSMENT and PLAN    ICD-10-CM ICD-9-CM    1. Encounter to establish care Z76.89 V65.8 AMB POC URINALYSIS DIP STICK AUTO W/O MICRO   2. Essential hypertension, benign I10 401.1 amLODIPine (NORVASC) 5 mg tablet      CBC WITH AUTOMATED DIFF      METABOLIC PANEL, COMPREHENSIVE      TSH 3RD GENERATION      CHLAMYDIA/GC PCR      AMB POC URINALYSIS DIP STICK AUTO W/O MICRO      CANCELED: AMB POC URINALYSIS DIP STICK AUTO W/ MICRO    3. Urinary frequency M38.0 753.71 METABOLIC PANEL, COMPREHENSIVE      AMB POC HEMOGLOBIN A1C      PSA W/ REFLX FREE PSA      CHLAMYDIA/GC PCR      CANCELED: AMB POC URINALYSIS DIP STICK AUTO W/ MICRO    4. Claudication of left lower extremity (HCC) I73.9 443. 9 ANKLE BRACHIAL INDEX   5. Erectile dysfunction, unspecified erectile dysfunction type N52.9 607.84 sildenafil citrate (VIAGRA) 100 mg tablet   6. Tobacco abuse Z72.0 305.1 varenicline (CHANTIX STARTER QI) 0.5 mg (11)- 1 mg (42) DsPk      varenicline (CHANTIX CONTINUING MONTH BOX) 1 mg tablet   7. Screen for STD (sexually transmitted disease) Z11.3 V74.5 CHLAMYDIA/GC PCR   8. Prostate cancer screening Z12.5 V76.44 PSA W/ REFLX FREE PSA   9. Encounter for immunization Z23 V03.89 INFLUENZA VIRUS VAC QUAD,SPLIT,PRESV FREE SYRINGE IM      AK IMMUNIZ ADMIN,1 SINGLE/COMB VAC/TOXOID     Encounter Diagnoses   Name Primary?     Encounter to establish care Yes    Essential hypertension, benign     Urinary frequency     Claudication of left lower extremity (HCC)     Erectile dysfunction, unspecified erectile dysfunction type     Tobacco abuse     Screen for STD (sexually transmitted disease)     Prostate cancer screening     Encounter for immunization      Orders Placed This Encounter    CHLAMYDIA/GC PCR    Influenza virus vaccine (QUADRIVALENT PRES FREE SYRINGE) IM ([id number])    CBC WITH AUTOMATED DIFF    METABOLIC PANEL, COMPREHENSIVE    TSH 3RD GENERATION    PSA W/ REFLX FREE PSA    AMB POC HEMOGLOBIN A1C    AMB POC URINALYSIS DIP STICK AUTO W/O MICRO    amLODIPine (NORVASC) 5 mg tablet    sildenafil citrate (VIAGRA) 100 mg tablet    varenicline (CHANTIX STARTER QI) 0.5 mg (11)- 1 mg (42) DsPk    varenicline (CHANTIX CONTINUING MONTH BOX) 1 mg tablet     Diagnoses and all orders for this visit:    1. Encounter to establish care  -     AMB POC URINALYSIS DIP STICK AUTO W/O MICRO    2. Essential hypertension, benign  -     amLODIPine (NORVASC) 5 mg tablet; TAKE 1 TABLET BY MOUTH EVERY DAY  -     CBC WITH AUTOMATED DIFF  -     METABOLIC PANEL, COMPREHENSIVE  -     TSH 3RD GENERATION  -     CHLAMYDIA/GC PCR  -     AMB POC URINALYSIS DIP STICK AUTO W/O MICRO    3. Urinary frequency  -     METABOLIC PANEL, COMPREHENSIVE  -     AMB POC HEMOGLOBIN A1C  -     PSA W/ REFLX FREE PSA  -     CHLAMYDIA/GC PCR    4. Claudication of left lower extremity ([address]  -     ANKLE BRACHIAL INDEX; Future    5. Erectile dysfunction, unspecified erectile dysfunction type  -     sildenafil citrate (VIAGRA) 100 mg tablet; Take one tablet by mouth once daily as needed, take 30-60 minutes prior to sex on empty stomach    6. Tobacco abuse  -     varenicline (CHANTIX STARTER QI) 0.5 mg (11)- 1 mg (42) DsPk; Per dose pack instructions  -     varenicline (CHANTIX CONTINUING MONTH BOX) 1 mg tablet; Per dose pack instructions. Fill once completes starter pack    7.  Screen for STD (sexually transmitted disease)  -     CHLAMYDIA/GC PCR    8. Prostate cancer screening  -     PSA W/ REFLX FREE PSA    9. Encounter for immunization  -     INFLUENZA VIRUS VAC QUAD,SPLIT,PRESV FREE SYRINGE IM  -     MS IMMUNIZ ADMIN,1 SINGLE/COMB VAC/TOXOID      Follow-up and Dispositions    · Return in about 6 months (around 6/10/2020), or if symptoms worsen or fail to improve.       lab results and schedule of future lab studies reviewed with patient  reviewed diet, exercise and weight control    I have reviewed the patient's allergies and made any necessary changes. Medical, procedural, social and family histories have been reviewed and updated as medically indicated. I have reconciled and/or revised patient medications in the EMR. I have discussed each diagnosis listed in this note with Brody Upton and/or their family. I have discussed treatment options and the risk/benefit analysis of those options, including safe use of medications and possible medication side effects. Through the use of shared decision making we have agreed to the above plan. The patient has received an after-visit summary and questions were answered concerning future plans. Danay Thompson, [id number]    This note will not be viewable in [facility] [employer].

## 2019-12-11 LAB
ALBUMIN SERPL-MCNC: 4.4 G/DL (ref 3.5–5.5)
ALBUMIN/GLOB SERPL: 1.8 {RATIO} (ref 1.2–2.2)
ALP SERPL-CCNC: 101 IU/L (ref 39–117)
ALT SERPL-CCNC: 22 IU/L (ref 0–44)
AST SERPL-CCNC: 13 IU/L (ref 0–40)
BASOPHILS # BLD AUTO: 0 X10E3/UL (ref 0–0.2)
BASOPHILS NFR BLD AUTO: 1 %
BILIRUB SERPL-MCNC: 0.3 MG/DL (ref 0–1.2)
BUN SERPL-MCNC: 9 MG/DL (ref 6–24)
BUN/CREAT SERPL: 9 (ref 9–20)
CALCIUM SERPL-MCNC: 9.7 MG/DL (ref 8.7–10.2)
CHLORIDE SERPL-SCNC: 103 MMOL/L (ref 96–106)
CO2 SERPL-SCNC: 24 MMOL/L (ref 20–29)
CREAT SERPL-MCNC: 0.98 MG/DL (ref 0.76–1.27)
EOSINOPHIL # BLD AUTO: 0.6 X10E3/UL (ref 0–0.4)
EOSINOPHIL NFR BLD AUTO: 9 %
ERYTHROCYTE [DISTWIDTH] IN BLOOD BY AUTOMATED COUNT: 14 % (ref 12.3–15.4)
GLOBULIN SER CALC-MCNC: 2.5 G/DL (ref 1.5–4.5)
GLUCOSE SERPL-MCNC: 82 MG/DL (ref 65–99)
HCT VFR BLD AUTO: 44.3 % (ref 37.5–51)
HGB BLD-MCNC: 15.3 G/DL (ref 13–17.7)
IMM GRANULOCYTES # BLD AUTO: 0 X10E3/UL (ref 0–0.1)
IMM GRANULOCYTES NFR BLD AUTO: 0 %
LYMPHOCYTES # BLD AUTO: 2.2 X10E3/UL (ref 0.7–3.1)
LYMPHOCYTES NFR BLD AUTO: 36 %
MCH RBC QN AUTO: 29.8 PG (ref 26.6–33)
MCHC RBC AUTO-ENTMCNC: 34.5 G/DL (ref 31.5–35.7)
MCV RBC AUTO: 86 FL (ref 79–97)
MONOCYTES # BLD AUTO: 0.5 X10E3/UL (ref 0.1–0.9)
MONOCYTES NFR BLD AUTO: 9 %
NEUTROPHILS # BLD AUTO: 2.7 X10E3/UL (ref 1.4–7)
NEUTROPHILS NFR BLD AUTO: 45 %
PLATELET # BLD AUTO: 213 X10E3/UL (ref 150–450)
POTASSIUM SERPL-SCNC: 4.3 MMOL/L (ref 3.5–5.2)
PROT SERPL-MCNC: 6.9 G/DL (ref 6–8.5)
PSA SERPL-MCNC: 0.9 NG/ML (ref 0–4)
RBC # BLD AUTO: 5.14 X10E6/UL (ref 4.14–5.8)
REFLEX CRITERIA: NORMAL
SODIUM SERPL-SCNC: 142 MMOL/L (ref 134–144)
TSH SERPL DL<=0.005 MIU/L-ACNC: 1.6 UIU/ML (ref 0.45–4.5)
WBC # BLD AUTO: 6 X10E3/UL (ref 3.4–10.8)

## 2019-12-11 NOTE — PROGRESS NOTES
RECOMMENDATIONS:  Diabetes and thyroid screen negative. Liver and kidney function normal.  Blood counts normal (not anemic). Prostate cancer screening test negative.

## 2019-12-12 LAB
C TRACH RRNA SPEC QL NAA+PROBE: NEGATIVE
N GONORRHOEA RRNA SPEC QL NAA+PROBE: NEGATIVE

## 2019-12-18 ENCOUNTER — HOSPITAL ENCOUNTER (OUTPATIENT)
Dept: VASCULAR SURGERY | Age: 54
Discharge: HOME | End: 2019-12-18
Payer: COMMERCIAL

## 2019-12-18 DIAGNOSIS — I73.9 CLAUDICATION OF LEFT LOWER EXTREMITY (HCC): ICD-10-CM

## 2019-12-18 PROCEDURE — 93922 UPR/L XTREMITY ART 2 LEVELS: CPT

## 2019-12-19 DIAGNOSIS — I73.9 PAD (PERIPHERAL ARTERY DISEASE) (HCC): Primary | ICD-10-CM

## 2019-12-19 DIAGNOSIS — I73.9 CLAUDICATION (HCC): ICD-10-CM

## 2019-12-19 LAB
LEFT ABI: 0.65
LEFT ANTERIOR TIBIAL: 94 MMHG
LEFT ARM BP: 148 MMHG
LEFT ATA BP LEVEL: NORMAL
LEFT POSTERIOR TIBIAL: 97 MMHG
LEFT TBI: 0.33
LEFT TOE PRESSURE: 49 MMHG
RIGHT ABI: 1.11
RIGHT ANTERIOR TIBIAL: 160 MMHG
RIGHT ARM BP: 150 MMHG
RIGHT ATA BP LEVEL: NORMAL
RIGHT POSTERIOR TIBIAL: 166 MMHG
RIGHT TBI: 0.58
RIGHT TOE PRESSURE: 87 MMHG

## 2019-12-19 NOTE — PROGRESS NOTES
Results of ILIR shows arterial disease in left leg. Need to refer to vascular for further evaluation.

## 2019-12-20 ENCOUNTER — TELEPHONE (OUTPATIENT)
Dept: FAMILY MEDICINE CLINIC | Age: 54
End: 2019-12-20

## 2019-12-20 NOTE — TELEPHONE ENCOUNTER
Call   Can you set up appt with Dr. Cordell Mckeon or group[facility] Urbina    RECOMMENDATIONS:  Results of ILIR shows arterial disease in left leg. Need to refer to vascular for further evaluation.

## 2019-12-23 NOTE — TELEPHONE ENCOUNTER
Pt scheduled with Dr. Clarissa [facility]. Appt 12/26/2019 at 1:30 PM will 1 PM arrival time. Verified patient with two type of identifiers. Informed pt of ILIR results and appt with Dr. Clarissa Muse. Pt verbalized understanding.

## 2020-01-31 ENCOUNTER — TELEPHONE (OUTPATIENT)
Dept: FAMILY MEDICINE CLINIC | Age: 55
End: 2020-01-31

## 2020-01-31 NOTE — TELEPHONE ENCOUNTER
----- Message from Rohan Guillen sent at 1/31/2020 12:33 PM EST -----  Regarding: NP Donna/refill  Contact: [phone]  Best contact number(s): [phone]  Name of medication and dosage if known: Viagra  Is patient out of this medication (yes/no): yes  Pharmacy name: [address] [place] [address] listed in chart? (yes/no): yes  Pharmacy phone number: unknown         Date of last visit: 12/10/2019

## 2020-01-31 NOTE — TELEPHONE ENCOUNTER
Patient called and left message stating he needed a new prescription for his Viagra. Per record it appears a script was called in on 12/10/19 to [facility] pharmacy with 5 refills. Not sure how often patient is using them, but wanted to let you know.       Thanks,  Gely Sullivan

## 2020-01-31 NOTE — TELEPHONE ENCOUNTER
Verified patient with two type of identifiers. Pt states took medication to [facility] and was unaware of refills on prescription. Pt states will call for refill.

## 2020-02-03 ENCOUNTER — DOCUMENTATION ONLY (OUTPATIENT)
Dept: FAMILY MEDICINE CLINIC | Age: 55
End: 2020-02-03

## 2020-02-03 NOTE — PROGRESS NOTES
Called PA into pt's pharmacy for Chantix Starter pack and Chantix 1 mg continuing dosage. APPROVED. Pharm notified.

## 2020-07-06 ENCOUNTER — VIRTUAL VISIT (OUTPATIENT)
Dept: FAMILY MEDICINE CLINIC | Age: 55
End: 2020-07-06

## 2020-07-06 DIAGNOSIS — K21.9 GASTROESOPHAGEAL REFLUX DISEASE, ESOPHAGITIS PRESENCE NOT SPECIFIED: Primary | ICD-10-CM

## 2020-07-06 DIAGNOSIS — I73.9 PAD (PERIPHERAL ARTERY DISEASE) (HCC): ICD-10-CM

## 2020-07-06 DIAGNOSIS — Z72.0 TOBACCO ABUSE: ICD-10-CM

## 2020-07-06 DIAGNOSIS — R68.81 EARLY SATIETY: ICD-10-CM

## 2020-07-06 RX ORDER — IBUPROFEN 200 MG
1 TABLET ORAL EVERY 24 HOURS
Qty: 30 PATCH | Refills: 2 | Status: SHIPPED | OUTPATIENT
Start: 2020-07-06 | End: 2021-03-16

## 2020-07-06 RX ORDER — CLOPIDOGREL BISULFATE 75 MG/1
1 TABLET ORAL DAILY
COMMUNITY
Start: 2020-06-15 | End: 2020-10-29 | Stop reason: ALTCHOICE

## 2020-07-06 RX ORDER — OMEPRAZOLE 20 MG/1
20 CAPSULE, DELAYED RELEASE ORAL DAILY
Qty: 30 CAP | Refills: 1 | Status: SHIPPED | OUTPATIENT
Start: 2020-07-06 | End: 2020-08-29

## 2020-07-06 RX ORDER — ASPIRIN 81 MG/1
1 TABLET ORAL DAILY
COMMUNITY
Start: 2020-06-15

## 2020-07-06 RX ORDER — CILOSTAZOL 100 MG/1
1 TABLET ORAL 2 TIMES DAILY
COMMUNITY
Start: 2020-06-19 | End: 2021-06-10

## 2020-07-06 NOTE — PROGRESS NOTES
Chief Complaint   Patient presents with    Abdominal Pain     x 2 weeks    Heartburn       1. Have you been to the ER, urgent care clinic since your last visit? Hospitalized since your last visit? No    2. Have you seen or consulted any other health care providers outside of the [address] [place] [address] since your last visit? Include any pap smears or colon screening.  Yes, [facility]     Health Maintenance Due   Topic Date Due    Pneumococcal 0-64 years (1 of 1 - PPSV23) [birth year]1971    Shingrix Vaccine Age 50> (1 of 2) 04/07/2015    Colonoscopy  08/13/2020

## 2020-07-06 NOTE — PATIENT INSTRUCTIONS
Gastroesophageal Reflux Disease (GERD): Care Instructions Your Care Instructions Gastroesophageal reflux disease (GERD) is the backward flow of stomach acid into the esophagus. The esophagus is the tube that leads from your throat to your stomach. A one-way valve prevents the stomach acid from backing up into this tube. When you have GERD, this valve does not close tightly enough. This can also cause pain and swelling in your esophagus (esophagitis). If you have mild GERD symptoms including heartburn, you may be able to control the problem with antacids or over-the-counter medicine. Changing your diet and eating habits, such as not eating late at night, losing weight, and making other lifestyle changes can also help reduce symptoms. Follow-up care is a key part of your treatment and safety. Be sure to make and go to all appointments, and call your doctor if you are having problems. It's also a good idea to know your test results and keep a list of the medicines you take. How can you care for yourself at home? · Take your medicines exactly as prescribed. Call your doctor if you think you are having a problem with your medicine. · Your doctor may recommend over-the-counter medicine. For mild or occasional indigestion, antacids, such as Tums, Gaviscon, Mylanta, or Maalox, may help. Your doctor also may recommend over-the-counter acid reducers, such as Pepcid AC (famotidine), Tagamet HB (cimetidine), or Prilosec (omeprazole). Read and follow all instructions on the label. If you use these medicines often, talk with your doctor. · Change your eating habits. ? It's best to eat several small meals instead of two or three large meals. ? After you eat, wait 2 to 3 hours before you lie down. ? Chocolate, mint, and alcohol can make GERD worse. ? Spicy foods, foods that have a lot of acid (like tomatoes and oranges), and coffee can make GERD symptoms worse in some people.  If your symptoms are worse after you eat a certain food, you may want to stop eating that food to see if your symptoms get better. · Do not smoke or chew tobacco. Smoking can make GERD worse. If you need help quitting, talk to your doctor about stop-smoking programs and medicines. These can increase your chances of quitting for good. · If you have GERD symptoms at night, raise the head of your bed 6 to 8 inches by putting the frame on blocks or placing a foam wedge under the head of your mattress. (Adding extra pillows does not work.) · Do not wear tight clothing around your middle. · Lose weight if you need to. Losing just 5 to 10 pounds can help. When should you call for help? Call your doctor now or seek immediate medical care if: 
· You have new or different belly pain. · Your stools are black and tarlike or have streaks of blood. Watch closely for changes in your health, and be sure to contact your doctor if: 
· Your symptoms have not improved after 2 days. · Food seems to catch in your throat or chest. 
Where can you learn more? Go to http://www.gray.com/ Enter M838 in the search box to learn more about \"Gastroesophageal Reflux Disease (GERD): Care Instructions. \" Current as of: August 12, 2019               Content Version: 12.5 © 7714-0227 [employer]. Care instructions adapted under license by [employer] (which disclaims liability or warranty for this information). If you have questions about a medical condition or this instruction, always ask your healthcare professional. [place] [address] any warranty or liability for your use of this information.

## 2020-07-06 NOTE — PROGRESS NOTES
Ilana Hayes is a 54 y.o. male who was seen by synchronous (real-time) audio-video technology on 7/6/2020 for Abdominal Pain (x 2 weeks) and Heartburn        Assessment & Plan:   Diagnoses and all orders for this visit:    1. Gastroesophageal reflux disease, esophagitis presence not specified - start Prilosec. Reviewed GERD, diet modifications. See GI  -     omeprazole (PRILOSEC) 20 mg capsule; Take 1 Cap by mouth daily. 2. Early satiety  -     REFERRAL TO GASTROENTEROLOGY - patient to make appt with GI    3. Tobacco abuse  -     nicotine (NICODERM CQ) 14 mg/24 hr patch; 1 Patch by TransDERmal route every twenty-four (24) hours for 30 days. 4. PAD (peripheral artery disease) ([facility] [address] - being followed by vascular            I spent at least 15 minutes on this visit with this established patient. 712  Subjective:   Dx with PAD in Dec 2019 after abnormal ILIR. Saw vascular, had procedure in January 2020 - Dr. Heather Lowe. Had another procedure couple weeks ago. If these continue to fail, will need fem-pop    Patient complains of abd pain and heartburn for 2 weeks. Complains of burning in upper stomach. Sometimes food does not want to stay down, will feel acid in chest.  Tries to stay away from spicy foods. Does drink some tea. Not taking any aDvil or Aleve. May drink 1-2 beers when he gets off work. Has been known to eat late at night. Has taken Nexium 10-15 years ago. None recently. No previous EGD. Having some early satiety for several months. Colonoscopy was done in [place], needs GI referral for [facility]. The pathophysiology of reflux is discussed.  Anti-reflux measures such as raising the head of the bed, avoiding tight clothing or belts, avoiding eating late at night and not lying down shortly after mealtime and achieving weight loss are discussed. Avoid ASA, NSAID's, caffeine, peppermints, alcohol and tobacco.   Interested in nicotine patches.   He has cut back on smoking, down to 6 or 7 daily. Was not interested in Chantix due to side effects  Prior to Admission medications    Medication Sig Start Date End Date Taking? Authorizing Provider   clopidogreL (PLAVIX) 75 mg tab Take 1 Tab by mouth daily. 6/15/20  Yes Provider, Historical   aspirin delayed-release 81 mg tablet Take 1 Tab by mouth daily. 6/15/20  Yes Provider, Historical   cilostazoL (PLETAL) 100 mg tablet Take 1 Tab by mouth two (2) times a day. 6/19/20  Yes Provider, Historical   amLODIPine (NORVASC) 5 mg tablet TAKE 1 TABLET BY MOUTH EVERY DAY 12/10/19  Yes Billie Thompson NP   sildenafil citrate (VIAGRA) 100 mg tablet Take one tablet by mouth once daily as needed, take 30-60 minutes prior to sex on empty stomach 12/10/19  Yes Billie Thompson NP   varenicline (CHANTIX STARTER QI) 0.5 mg (11)- 1 mg (42) DsPk Per dose pack instructions  Patient not taking: Reported on 7/6/2020 12/10/19   Les Galvez NP   ibuprofen (MOTRIN) 800 mg tablet 1 tid with food prn pain 7/1/19   Ezequiel Espinal MD     Patient Active Problem List   Diagnosis Code    Esophagitis K20.9    Essential hypertension, benign I10     Current Outpatient Medications   Medication Sig Dispense Refill    clopidogreL (PLAVIX) 75 mg tab Take 1 Tab by mouth daily.  aspirin delayed-release 81 mg tablet Take 1 Tab by mouth daily.  cilostazoL (PLETAL) 100 mg tablet Take 1 Tab by mouth two (2) times a day.       amLODIPine (NORVASC) 5 mg tablet TAKE 1 TABLET BY MOUTH EVERY DAY 90 Tab 3    sildenafil citrate (VIAGRA) 100 mg tablet Take one tablet by mouth once daily as needed, take 30-60 minutes prior to sex on empty stomach 30 Tab 5    varenicline (CHANTIX STARTER QI) 0.5 mg (11)- 1 mg (42) DsPk Per dose pack instructions (Patient not taking: Reported on 7/6/2020) 1 Dose Pack 0    ibuprofen (MOTRIN) 800 mg tablet 1 tid with food prn pain 30 Tab 1     No Known Allergies  Past Medical History:   Diagnosis Date    Esophagitis     HTN (hypertension) 2016    IGT (impaired glucose tolerance)      Past Surgical History:   Procedure Laterality Date    HX COLONOSCOPY  2015    polyps     Family History   Problem Relation Age of Onset    Arthritis-osteo Father     Cataract Father     Other Mother 58    Stroke Mother     Diabetes Mother     Hypertension Mother     Hypertension Sister     Diabetes Brother     Kidney Disease Brother         on HD,  in late 58s    Heart Disease Brother         CABG,  in late 62s    Seizures Brother    [facility] Hypertension Sister     Hypertension Sister     Hypertension Sister      Social History     Tobacco Use    Smoking status: Current Every Day Smoker     Packs/day: 0.50     Types: Cigarettes    Smokeless tobacco: Never Used   Substance Use Topics    Alcohol use: Yes     Comment: socially       Review of Systems   Constitutional: Negative for chills, fever and weight loss. Respiratory: Negative. Cardiovascular: Negative. Gastrointestinal: Positive for abdominal pain and heartburn. Negative for blood in stool, constipation, diarrhea, melena, nausea and vomiting. Early satiety       Objective:   No flowsheet data found. General: alert, cooperative, no distress   Mental  status: normal mood, behavior, speech, dress, motor activity, and thought processes, able to follow commands   HENT: NCAT   Neck: no visualized mass   Resp: no respiratory distress   Neuro: no gross deficits   Skin: no discoloration or lesions of concern on visible areas   Psychiatric: normal affect, consistent with stated mood, no evidence of hallucinations     Additional exam findings: none      We discussed the expected course, resolution and complications of the diagnosis(es) in detail. Medication risks, benefits, costs, interactions, and alternatives were discussed as indicated. I advised him to contact the office if his condition worsens, changes or fails to improve as anticipated.  He expressed understanding with the diagnosis(es) and plan. Rebekah Morfin, who was evaluated through a patient-initiated, synchronous (real-time) audio-video encounter, and/or his healthcare decision maker, is aware that it is a billable service, with coverage as determined by his insurance carrier. He provided verbal consent to proceed: Yes, and patient identification was verified. It was conducted pursuant to the emergency declaration under the [address] [place] [address] waiver authority and the [employer] and [employer] General Act. A caregiver was present when appropriate. Ability to conduct physical exam was limited. I was at home. The patient was at home.       Heron Truong NP

## 2020-08-26 DIAGNOSIS — K21.9 GASTROESOPHAGEAL REFLUX DISEASE, ESOPHAGITIS PRESENCE NOT SPECIFIED: ICD-10-CM

## 2020-08-29 RX ORDER — OMEPRAZOLE 20 MG/1
CAPSULE, DELAYED RELEASE ORAL
Qty: 30 CAP | Refills: 2 | Status: SHIPPED | OUTPATIENT
Start: 2020-08-29 | End: 2020-11-16

## 2020-10-29 ENCOUNTER — OFFICE VISIT (OUTPATIENT)
Dept: FAMILY MEDICINE CLINIC | Age: 55
End: 2020-10-29
Payer: COMMERCIAL

## 2020-10-29 VITALS
RESPIRATION RATE: 12 BRPM | HEART RATE: 78 BPM | SYSTOLIC BLOOD PRESSURE: 132 MMHG | BODY MASS INDEX: 28.09 KG/M2 | WEIGHT: 179 LBS | HEIGHT: 67 IN | DIASTOLIC BLOOD PRESSURE: 80 MMHG | TEMPERATURE: 98.2 F | OXYGEN SATURATION: 98 %

## 2020-10-29 DIAGNOSIS — I10 ESSENTIAL HYPERTENSION, BENIGN: ICD-10-CM

## 2020-10-29 DIAGNOSIS — Z72.0 TOBACCO ABUSE: ICD-10-CM

## 2020-10-29 DIAGNOSIS — I73.9 PAD (PERIPHERAL ARTERY DISEASE) (HCC): ICD-10-CM

## 2020-10-29 DIAGNOSIS — M25.512 LEFT SHOULDER PAIN, UNSPECIFIED CHRONICITY: Primary | ICD-10-CM

## 2020-10-29 DIAGNOSIS — R23.3 EASY BRUISING: ICD-10-CM

## 2020-10-29 DIAGNOSIS — Z86.010 HISTORY OF COLON POLYPS: ICD-10-CM

## 2020-10-29 DIAGNOSIS — Z12.11 COLON CANCER SCREENING: ICD-10-CM

## 2020-10-29 DIAGNOSIS — M75.82 ROTATOR CUFF TENDINITIS, LEFT: ICD-10-CM

## 2020-10-29 DIAGNOSIS — Z12.5 PROSTATE CANCER SCREENING: ICD-10-CM

## 2020-10-29 DIAGNOSIS — Z23 NEEDS FLU SHOT: ICD-10-CM

## 2020-10-29 PROCEDURE — 90471 IMMUNIZATION ADMIN: CPT

## 2020-10-29 PROCEDURE — 99214 OFFICE O/P EST MOD 30 MIN: CPT | Performed by: NURSE PRACTITIONER

## 2020-10-29 PROCEDURE — 90686 IIV4 VACC NO PRSV 0.5 ML IM: CPT

## 2020-10-29 RX ORDER — PREDNISONE 20 MG/1
20 TABLET ORAL 2 TIMES DAILY
Qty: 10 TAB | Refills: 0 | Status: SHIPPED | OUTPATIENT
Start: 2020-10-29 | End: 2020-11-03

## 2020-10-29 NOTE — PROGRESS NOTES
Chief Complaint   Patient presents with    Shoulder Pain     Left x 1 month    Bleeding/Bruising     Right Arm x 3 weeks        1. Have you been to the ER, urgent care clinic since your last visit? Hospitalized since your last visit? No    2. Have you seen or consulted any other health care providers outside of the [address] [place] [address] since your last visit? Include any pap smears or colon screening. Yes, Dr. Haley Mojica, had stent placed in leg leg. Flu shot - Pt accepts  Verbal Order with Readback given by Kevin Cole NP for Influenza. Given in Right Deltoid without difficulty.       Health Maintenance Due   Topic Date Due    Pneumococcal 0-64 years (1 of 1 - PPSV23) 04/07/1971    Shingrix Vaccine Age 50> (1 of 2) 04/07/2015    Colorectal Cancer Screening Combo  08/13/2020    Flu Vaccine (1) 09/01/2020

## 2020-10-29 NOTE — PROGRESS NOTES
HISTORY OF PRESENT ILLNESS  Rock Moore is a 54 y.o. male. HPI  Patient comes in today for left shoulder pain. Complains of left shoulder pain for about 1 month. Does not recall injury. Does not recall hearing any popping sounds. States it is difficult to raise left arm above head. Denies numbness or tingling, weakness. No rash. Eating healthy. Making smoothies in mornings. Drinking beet juice. Stopped smoking for 2 weeks, but started smoking again. Smoking about 1 ppd. Plans on quitting again soon. Was seen by vascular in Jan 2020, dx with PAD, had stent placed in leg. Was taking Plavix but has been discontinued. Still taking ASA 81mg and Pletal.  States he has ability to walk further,.   No Known Allergies    Past Medical History:   Diagnosis Date    Esophagitis     HTN (hypertension) 2/2016    IGT (impaired glucose tolerance)        Past Surgical History:   Procedure Laterality Date    HX COLONOSCOPY  8/2015    polyps       Social History     Socioeconomic History    Marital status: LEGALLY      Spouse name: Not on file    Number of children: Not on file    Years of education: Not on file    Highest education level: Not on file   Occupational History    Not on file   Social Needs    Financial resource strain: Not on file    Food insecurity     Worry: Not on file     Inability: Not on file    Transportation needs     Medical: Not on file     Non-medical: Not on file   Tobacco Use    Smoking status: Current Every Day Smoker     Packs/day: 0.50     Types: Cigarettes    Smokeless tobacco: Never Used   Substance and Sexual Activity    Alcohol use: Yes     Comment: socially    Drug use: Not Currently     Types: Marijuana    Sexual activity: Not Currently   Lifestyle    Physical activity     Days per week: Not on file     Minutes per session: Not on file    Stress: Not on file   Relationships    Social connections     Talks on phone: Not on file     Gets together: Not on file     Attends Voodoo service: Not on file     Active member of club or organization: Not on file     Attends meetings of clubs or organizations: Not on file     Relationship status: Not on file    Intimate partner violence     Fear of current or ex partner: Not on file     Emotionally abused: Not on file     Physically abused: Not on file     Forced sexual activity: Not on file   Other Topics Concern    Not on file   Social History Narrative    2 biological (27yo son and 30y daughter)  and 1 stepson, age 28y . Works for [employer] as , part-time at [employer]. Family History   Problem Relation Age of Onset    Arthritis-osteo Father     Cataract Father     Other Mother 58    Stroke Mother     Diabetes Mother     Hypertension Mother     Hypertension Sister     Diabetes Brother     Kidney Disease Brother         on HD,  in late 58s    Heart Disease Brother         CABG,  in late 62s    Seizures Brother    Lenetta Ni Hypertension Sister     Hypertension Sister     Hypertension Sister        Current Outpatient Medications   Medication Sig    omeprazole (PRILOSEC) 20 mg capsule TAKE 1 CAPSULE BY MOUTH EVERY DAY    clopidogreL (PLAVIX) 75 mg tab Take 1 Tab by mouth daily.  aspirin delayed-release 81 mg tablet Take 1 Tab by mouth daily.  cilostazoL (PLETAL) 100 mg tablet Take 1 Tab by mouth two (2) times a day.  amLODIPine (NORVASC) 5 mg tablet TAKE 1 TABLET BY MOUTH EVERY DAY    sildenafil citrate (VIAGRA) 100 mg tablet Take one tablet by mouth once daily as needed, take 30-60 minutes prior to sex on empty stomach    ibuprofen (MOTRIN) 800 mg tablet 1 tid with food prn pain     No current facility-administered medications for this visit. Review of Systems   Constitutional: Negative for chills and fever. Respiratory: Negative for cough and shortness of breath. Cardiovascular: Positive for claudication (improvement in symptoms). Negative for chest pain and palpitations. Gastrointestinal: Negative for abdominal pain, diarrhea, nausea and vomiting. Genitourinary: Negative for dysuria, flank pain, frequency, hematuria and urgency. Musculoskeletal: Positive for joint pain (left shoulder pain). Neurological: Negative for tingling, sensory change, focal weakness and weakness. Endo/Heme/Allergies: Bruises/bleeds easily (bruise for 3 weeks on right arm). Physical Exam  Vitals signs reviewed. Constitutional:       Appearance: Normal appearance. He is well-developed and well-groomed. Cardiovascular:      Rate and Rhythm: Normal rate and regular rhythm. Pulses:           Dorsalis pedis pulses are 2+ on the right side and 2+ on the left side. Heart sounds: Normal heart sounds. Pulmonary:      Effort: Pulmonary effort is normal.      Breath sounds: Normal breath sounds. Abdominal:      General: Bowel sounds are normal.      Palpations: Abdomen is soft. Tenderness: There is no abdominal tenderness. Musculoskeletal:      Left shoulder: He exhibits decreased range of motion, tenderness and pain. He exhibits no bony tenderness, no swelling, no crepitus, no spasm, normal pulse and normal strength. Right lower leg: No edema. Left lower leg: No edema. Comments: Positive impingement left arm   Skin:     General: Skin is warm and dry. Neurological:      Mental Status: He is alert and oriented to person, place, and time. Psychiatric:         Attention and Perception: Attention normal.         Mood and Affect: Mood and affect normal.         Speech: Speech normal.         Behavior: Behavior normal. Behavior is cooperative. Thought Content: Thought content normal.         Judgment: Judgment normal.         ASSESSMENT and PLAN    ICD-10-CM ICD-9-CM    1. Left shoulder pain, unspecified chronicity  M25.512 719.41 XR SHOULDER LT AP/LAT MIN 2 V   2.  Rotator cuff tendinitis, left  M75.82 726.10 predniSONE (DELTASONE) 20 mg tablet   3. Easy bruising  Q05.7 555.8 METABOLIC PANEL, COMPREHENSIVE      CBC WITH AUTOMATED DIFF      TSH 3RD GENERATION      IRON PROFILE      PTT      PROTHROMBIN TIME + INR      FERRITIN   4. Essential hypertension, benign  I78 410.9 METABOLIC PANEL, COMPREHENSIVE      CBC WITH AUTOMATED DIFF      LIPID PANEL      TSH 3RD GENERATION   5. PAD (peripheral artery disease) (HCC)  I73.9 443.9    6. Tobacco abuse  Z72.0 305.1    7. Prostate cancer screening  Z12.5 V76.44 PSA W/ REFLX FREE PSA   8. Colon cancer screening  Z12.11 V76.51 REFERRAL TO GASTROENTEROLOGY   9. History of colon polyps  Z86.010 V12.72 REFERRAL TO GASTROENTEROLOGY   10. Needs flu shot  Z23 V04.81 INFLUENZA VIRUS VAC QUAD,SPLIT,PRESV FREE SYRINGE IM     Encounter Diagnoses   Name Primary?  Left shoulder pain, unspecified chronicity Yes    Rotator cuff tendinitis, left     Easy bruising     Essential hypertension, benign     PAD (peripheral artery disease) (HCC)     Tobacco abuse     Prostate cancer screening     Colon cancer screening     History of colon polyps     Needs flu shot      Orders Placed This Encounter    XR SHOULDER LT AP/LAT MIN 2 V    Influenza Virus Vaccine QUAD, PF Syr 6 Months + (Flulaval, Fluzone, Fluarix 67823)    METABOLIC PANEL, COMPREHENSIVE    CBC WITH AUTOMATED DIFF    LIPID PANEL    PSA W/ REFLX FREE PSA    TSH 3RD GENERATION    IRON PROFILE    PTT    PROTHROMBIN TIME + INR    FERRITIN    [facility]    predniSONE (DELTASONE) 20 mg tablet     Diagnoses and all orders for this visit:    1. Left shoulder pain, unspecified chronicity  -     XR SHOULDER LT AP/LAT MIN 2 V; Future    2. Rotator cuff tendinitis, left - given steroid burst, HEP. If no improvement in 1-2 weeks, will refer to outpt PT and /or ortho  -     predniSONE (DELTASONE) 20 mg tablet; Take 20 mg by mouth two (2) times a day for 5 days.     3. Easy bruising  -     METABOLIC PANEL, COMPREHENSIVE; Future  -     CBC WITH AUTOMATED DIFF; Future  -     TSH 3RD GENERATION; Future  -     IRON PROFILE; Future  -     PTT; Future  -     PROTHROMBIN TIME + INR; Future  -     FERRITIN; Future    4. Essential hypertension, benign  -     METABOLIC PANEL, COMPREHENSIVE; Future  -     CBC WITH AUTOMATED DIFF; Future  -     LIPID PANEL; Future  -     TSH 3RD GENERATION; Future    5. PAD (peripheral artery disease) ([facility] [address]    6. Tobacco abuse    7. Prostate cancer screening  -     PSA W/ REFLX FREE PSA; Future    8. Colon cancer screening  -     REFERRAL TO GASTROENTEROLOGY    9. History of colon polyps  -     REFERRAL TO GASTROENTEROLOGY    10. Needs flu shot  -     INFLUENZA VIRUS VAC QUAD,SPLIT,PRESV FREE SYRINGE IM      Follow-up and Dispositions    · Return in about 6 months (around 4/29/2021), or if symptoms worsen or fail to improve.       lab results and schedule of future lab studies reviewed with patient  radiology results and schedule of future radiology studies reviewed with patient    I have reviewed the patient's allergies and made any necessary changes. Medical, procedural, social and family histories have been reviewed and updated as medically indicated. I have reconciled and/or revised patient medications in the EMR. I have discussed each diagnosis listed in this note with John Mckeon and/or their family. I have discussed treatment options and the risk/benefit analysis of those options, including safe use of medications and possible medication side effects. Through the use of shared decision making we have agreed to the above plan. The patient has received an after-visit summary and questions were answered concerning future plans. ZAHRAA Coto

## 2020-10-29 NOTE — PATIENT INSTRUCTIONS
Rotator Cuff Problems: Care Instructions Your Care Instructions The rotator cuff is a group of tendons and muscles around the shoulder that keeps the shoulder joint stable and allows you to raise and rotate your arm. Over time, daily wear and exercise can cause the tendons to rub on the bones of your shoulder. This is called impingement. This condition may cause the tendons to bruise, degenerate, or tear. In many people, these problems do not cause pain. When they do cause pain, you can use rest, physical therapy, ice and heat, and anti-inflammatory medicine to reduce pain and swelling. If you still have pain after trying these treatments, you and your doctor can discuss having a steroid injection or surgery. Follow-up care is a key part of your treatment and safety. Be sure to make and go to all appointments, and call your doctor if you are having problems. It's also a good idea to know your test results and keep a list of the medicines you take. How can you care for yourself at home? · Be safe with medicines. Read and follow all instructions on the label. ? If the doctor gave you a prescription medicine for pain, take it as prescribed. ? If you are not taking a prescription pain medicine, ask your doctor if you can take an over-the-counter medicine. · Put ice or a cold pack on your shoulder for 10 to 20 minutes at a time. Try to do this every 1 to 2 hours for the next 3 days (when you are awake). Put a thin cloth between the ice pack and your skin. · After 3 days, put a warm, wet towel on your shoulder. This is to relax the muscles and increase blood flow. While holding the towel on your shoulder, lean forward so your arm hangs freely, and gently swing your arm back and forth like a pendulum. You also can do this standing under a warm shower. · Follow your doctor's advice for physical therapy. When your doctor says it is okay, try these stretching exercises.  Do them slowly to avoid injury. Put a warm, wet towel on your shoulder before exercising. Stop any exercise that increases pain. ? Range-of-motion exercises. If it is not too painful, stretch your arm in four directions: across the body, up the back, to the side, and overhead. ? Pendulum exercise. Lean forward and hold onto a table or the back of a chair with your good arm. Bend at the waist, letting the arm with the sore shoulder hang straight down. Swing your arm back and forth like a pendulum, then in circles that start small and slowly grow larger. This exercise does not use the arm muscles. Instead, use your legs and your hips to create movement that makes your arm swing freely. Try this for about 5 minutes, several times a day. ? Wall climbing (to the side). Stand with your side to a wall so that your fingers can just touch it. Then turn so your body is turned slightly toward the wall. Walk the fingers of your injured arm up the wall as high as pain permits. Try not to shrug your shoulder up toward your ear as you move your arm up. Hold that position for a count of 15 to 30 seconds. Walk your fingers down to the starting position. Repeat 2 to 4 times, trying to reach higher each time. ? Wall climbing (to the front). Face a wall, standing so your fingers can just touch it. Walk the fingers of your affected arm up the wall as high as pain permits. Try not to shrug your shoulder up toward your ear as you move your arm up. Hold that position for a count of 15 to 30 seconds. Slowly walk your fingers to the starting position. Repeat 2 to 4 times, trying to reach higher each time. · Rest your shoulder when you are not doing stretches and other exercises. Your doctor may tell you to wait for the pain to go away before doing exercises. Do not lift heavy bags of groceries, play sports, or do anything else that makes you twist or stress your shoulder. Avoid activities where you move your affected arm above your head. When should you call for help? Call your doctor now or seek immediate medical care if: 
  · You have severe pain.  
  · You cannot move your shoulder or arm.  
  · You have tingling or numbness in your arm or hand.  
  · Your arm or hand is cool or pale. Watch closely for changes in your health, and be sure to contact your doctor if: 
  · Your pain gets worse.  
  · You have new or worse swelling in your arm or hand.  
  · You do not get better as expected. Where can you learn more? Go to http://www.gray.com/ Enter E207 in the search box to learn more about \"Rotator Cuff Problems: Care Instructions. \" Current as of: March 2, 2020               Content Version: 12.6 © 2006-2020 [employer]. Care instructions adapted under license by [employer] (which disclaims liability or warranty for this information). If you have questions about a medical condition or this instruction, always ask your healthcare professional. [place] [address] any warranty or liability for your use of this information. Rotator Cuff: Exercises Introduction Here are some examples of exercises for you to try. The exercises may be suggested for a condition or for rehabilitation. Start each exercise slowly. Ease off the exercises if you start to have pain. You will be told when to start these exercises and which ones will work best for you. How to do the exercises Pendulum swing If you have pain in your back, do not do this exercise. 1. Hold on to a table or the back of a chair with your good arm. Then bend forward a little and let your sore arm hang straight down. This exercise does not use the arm muscles. Rather, use your legs and your hips to create movement that makes your arm swing freely. 2. Use the movement from your hips and legs to guide the slightly swinging arm back and forth like a pendulum (or elephant trunk).  Then guide it in circles that start small (about the size of a dinner plate). Make the circles a bit larger each day, as your pain allows. 3. Do this exercise for 5 minutes, 5 to 7 times each day. 4. As you have less pain, try bending over a little farther to do this exercise. This will increase the amount of movement at your shoulder. Posterior stretching exercise 1. Hold the elbow of your injured arm with your other hand. 2. Use your hand to pull your injured arm gently up and across your body. You will feel a gentle stretch across the back of your injured shoulder. 3. Hold for at least 15 to 30 seconds. Then slowly lower your arm. 4. Repeat 2 to 4 times. Up-the-back stretch Your doctor or physical therapist may want you to wait to do this stretch until you have regained most of your range of motion and strength. You can do this stretch in different ways. Hold any of these stretches for at least 15 to 30 seconds. Repeat them 2 to 4 times. 1. Light stretch: Put your hand in your back pocket. Let it rest there to stretch your shoulder. 2. Moderate stretch: With your other hand, hold your injured arm (palm outward) behind your back by the wrist. Pull your arm up gently to stretch your shoulder. 3. Advanced stretch: Put a towel over your other shoulder. Put the hand of your injured arm behind your back. Now hold the back end of the towel. With the other hand, hold the front end of the towel in front of your body. Pull gently on the front end of the towel. This will bring your hand farther up your back to stretch your shoulder. Overhead stretch 1. Standing about an arm's length away, grasp onto a solid surface. You could use a countertop, a doorknob, or the back of a sturdy chair. 2. With your knees slightly bent, bend forward with your arms straight. Lower your upper body, and let your shoulders stretch. 3. As your shoulders are able to stretch farther, you may need to take a step or two backward. 4. Hold for at least 15 to 30 seconds. Then stand up and relax. If you had stepped back during your stretch, step forward so you can keep your hands on the solid surface. 5. Repeat 2 to 4 times. Shoulder flexion (lying down) To make a wand for this exercise, use a piece of PVC pipe or a broom handle with the broom removed. Make the wand about a foot wider than your shoulders. 1. Lie on your back, holding a wand with both hands. Your palms should face down as you hold the wand. 2. Keeping your elbows straight, slowly raise your arms over your head. Raise them until you feel a stretch in your shoulders, upper back, and chest. 
3. Hold for 15 to 30 seconds. 4. Repeat 2 to 4 times. Shoulder rotation (lying down) To make a wand for this exercise, use a piece of PVC pipe or a broom handle with the broom removed. Make the wand about a foot wider than your shoulders. 1. Lie on your back. Hold a wand with both hands with your elbows bent and palms up. 2. Keep your elbows close to your body, and move the wand across your body toward the sore arm. 3. Hold for 8 to 12 seconds. 4. Repeat 2 to 4 times. Wall climbing (to the side) Avoid any movement that is straight to your side, and be careful not to arch your back. Your arm should stay about 30 degrees to the front of your side. 1. Stand with your side to a wall so that your fingers can just touch it at an angle about 30 degrees toward the front of your body. 2. Walk the fingers of your injured arm up the wall as high as pain permits. Try not to shrug your shoulder up toward your ear as you move your arm up. 3. Hold that position for a count of at least 15 to 20. 
4. Walk your fingers back down to the starting position. 5. Repeat at least 2 to 4 times. Try to reach higher each time. Wall climbing (to the front) During this stretching exercise, be careful not to arch your back. 1. Face a wall, and stand so your fingers can just touch it. 2. Keeping your shoulder down, walk the fingers of your injured arm up the wall as high as pain permits. (Don't shrug your shoulder up toward your ear.) 3. Hold your arm in that position for at least 15 to 30 seconds. 4. Slowly walk your fingers back down to where you started. 5. Repeat at least 2 to 4 times. Try to reach higher each time. Shoulder blade squeeze 1. Stand with your arms at your sides, and squeeze your shoulder blades together. Do not raise your shoulders up as you squeeze. 2. Hold 6 seconds. 3. Repeat 8 to 12 times. Scapular exercise: Arm reach 1. Lie flat on your back. This exercise is a very slight motion that starts with your arms raised (elbows straight, arms straight). 2. From this position, reach higher toward the rajani or ceiling. Keep your elbows straight. All motion should be from your shoulder blade only. 3. Relax your arms back to where you started. 4. Repeat 8 to 12 times. Arm raise to the side During this strengthening exercise, your arm should stay about 30 degrees to the front of your side. 1. Slowly raise your injured arm to the side, with your thumb facing up. Raise your arm 60 degrees at the most (shoulder level is 90 degrees). 2. Hold the position for 3 to 5 seconds. Then lower your arm back to your side. If you need to, bring your \"good\" arm across your body and place it under the elbow as you lower your injured arm. Use your good arm to keep your injured arm from dropping down too fast. 
3. Repeat 8 to 12 times. 4. When you first start out, don't hold any extra weight in your hand. As you get stronger, you may use a 1-pound to 2-pound dumbbell or a small can of food. Shoulder flexor and extensor exercise These are isometric exercises. That means you contract your muscles without actually moving. 1. Push forward (flex): Stand facing a wall or doorjamb, about 6 inches or less back. Hold your injured arm against your body.  Make a closed fist with your thumb on top. Then gently push your hand forward into the wall with about 25% to 50% of your strength. Don't let your body move backward as you push. Hold for about 6 seconds. Relax for a few seconds. Repeat 8 to 12 times. 2. Push backward (extend): Stand with your back flat against a wall. Your upper arm should be against the wall, with your elbow bent 90 degrees (your hand straight ahead). Push your elbow gently back against the wall with about 25% to 50% of your strength. Don't let your body move forward as you push. Hold for about 6 seconds. Relax for a few seconds. Repeat 8 to 12 times. Scapular exercise: Wall push-ups This exercise is best done with your fingers somewhat turned out, rather than straight up and down. 1. Stand facing a wall, about 12 inches to 18 inches away. 2. Place your hands on the wall at shoulder height. 3. Slowly bend your elbows and bring your face to the wall. Keep your back and hips straight. 4. Push back to where you started. 5. Repeat 8 to 12 times. 6. When you can do this exercise against a wall comfortably, you can try it against a counter. You can then slowly progress to the end of a couch, then to a sturdy chair, and finally to the floor. Scapular exercise: Retraction For this exercise, you will need elastic exercise material, such as surgical tubing or Thera-Band. 1. Put the band around a solid object at about waist level. (A bedpost will work well.) Each hand should hold an end of the band. 2. With your elbows at your sides and bent to 90 degrees, pull the band back. Your shoulder blades should move toward each other. Then move your arms back where you started. 3. Repeat 8 to 12 times. 4. If you have good range of motion in your shoulders, try this exercise with your arms lifted out to the sides. Keep your elbows at a 90-degree angle. Raise the elastic band up to about shoulder level.  Pull the band back to move your shoulder blades toward each other. Then move your arms back where you started. Internal rotator strengthening exercise 1. Start by tying a piece of elastic exercise material to a doorknob. You can use surgical tubing or Thera-Band. 2. Stand or sit with your shoulder relaxed and your elbow bent 90 degrees. Your upper arm should rest comfortably against your side. Squeeze a rolled towel between your elbow and your body for comfort. This will help keep your arm at your side. 3. Hold one end of the elastic band in the hand of the painful arm. 4. Slowly rotate your forearm toward your body until it touches your belly. Slowly move it back to where you started. 5. Keep your elbow and upper arm firmly tucked against the towel roll or at your side. 6. Repeat 8 to 12 times. External rotator strengthening exercise 1. Start by tying a piece of elastic exercise material to a doorknob. You can use surgical tubing or Thera-Band. (You may also hold one end of the band in each hand.) 2. Stand or sit with your shoulder relaxed and your elbow bent 90 degrees. Your upper arm should rest comfortably against your side. Squeeze a rolled towel between your elbow and your body for comfort. This will help keep your arm at your side. 3. Hold one end of the elastic band with the hand of the painful arm. 4. Start with your forearm across your belly. Slowly rotate the forearm out away from your body. Keep your elbow and upper arm tucked against the towel roll or the side of your body until you begin to feel tightness in your shoulder. Slowly move your arm back to where you started. 5. Repeat 8 to 12 times. Follow-up care is a key part of your treatment and safety. Be sure to make and go to all appointments, and call your doctor if you are having problems. It's also a good idea to know your test results and keep a list of the medicines you take. Where can you learn more? Go to http://www.gray.com/ Olu Freire in the search box to learn more about \"Rotator Cuff: Exercises. \" Current as of: March 2, 2020               Content Version: 12.6 © 9435-0100 [employer]. Care instructions adapted under license by [employer] (which disclaims liability or warranty for this information). If you have questions about a medical condition or this instruction, always ask your healthcare professional. [address] any warranty or liability for your use of this information.

## 2020-11-13 DIAGNOSIS — K21.9 GASTROESOPHAGEAL REFLUX DISEASE, UNSPECIFIED WHETHER ESOPHAGITIS PRESENT: ICD-10-CM

## 2020-11-16 RX ORDER — OMEPRAZOLE 20 MG/1
CAPSULE, DELAYED RELEASE ORAL
Qty: 30 CAP | Refills: 0 | Status: SHIPPED | OUTPATIENT
Start: 2020-11-16 | End: 2021-12-10 | Stop reason: ALTCHOICE

## 2020-11-16 NOTE — TELEPHONE ENCOUNTER
Check with patient to see if he saw GI and/or if he is still having GERD symptoms if he does not take medication. The prilosec can interfere with the Pletal he takes if he is taking the prilosec long-term.   Would like for him to see GI, maybe have EGD done to see if he needs to continue therapy or something different (like Protonix)

## 2020-11-19 NOTE — TELEPHONE ENCOUNTER
Verified patient with two type of identifiers. Pt states hasn't stopped taking the omeprazole to know if symptoms are still there or not. Pt states currently not taking Pletal due to no response from the pharmacy. Pt instructed to call GI and call provider that prescribes Pletal. Pt verbalized understanding.

## 2020-12-05 LAB
ALBUMIN SERPL-MCNC: 4.4 G/DL (ref 3.8–4.9)
ALBUMIN/GLOB SERPL: 1.8 {RATIO} (ref 1.2–2.2)
ALP SERPL-CCNC: 102 IU/L (ref 39–117)
ALT SERPL-CCNC: 33 IU/L (ref 0–44)
APTT PPP: 30 SEC (ref 24–33)
AST SERPL-CCNC: 22 IU/L (ref 0–40)
BASOPHILS # BLD AUTO: 0 X10E3/UL (ref 0–0.2)
BASOPHILS NFR BLD AUTO: 1 %
BILIRUB SERPL-MCNC: 0.4 MG/DL (ref 0–1.2)
BUN SERPL-MCNC: 14 MG/DL (ref 6–24)
BUN/CREAT SERPL: 12 (ref 9–20)
CALCIUM SERPL-MCNC: 9.9 MG/DL (ref 8.7–10.2)
CHLORIDE SERPL-SCNC: 101 MMOL/L (ref 96–106)
CHOLEST SERPL-MCNC: 180 MG/DL (ref 100–199)
CO2 SERPL-SCNC: 25 MMOL/L (ref 20–29)
CREAT SERPL-MCNC: 1.16 MG/DL (ref 0.76–1.27)
EOSINOPHIL # BLD AUTO: 0.8 X10E3/UL (ref 0–0.4)
EOSINOPHIL NFR BLD AUTO: 10 %
ERYTHROCYTE [DISTWIDTH] IN BLOOD BY AUTOMATED COUNT: 13.8 % (ref 11.6–15.4)
FERRITIN SERPL-MCNC: 334 NG/ML (ref 30–400)
GLOBULIN SER CALC-MCNC: 2.5 G/DL (ref 1.5–4.5)
GLUCOSE SERPL-MCNC: 96 MG/DL (ref 65–99)
HCT VFR BLD AUTO: 46.5 % (ref 37.5–51)
HDLC SERPL-MCNC: 46 MG/DL
HGB BLD-MCNC: 15.8 G/DL (ref 13–17.7)
IMM GRANULOCYTES # BLD AUTO: 0 X10E3/UL (ref 0–0.1)
IMM GRANULOCYTES NFR BLD AUTO: 0 %
INR PPP: 1 (ref 0.9–1.2)
INTERPRETATION, 910389: NORMAL
IRON SATN MFR SERPL: 36 % (ref 15–55)
IRON SERPL-MCNC: 101 UG/DL (ref 38–169)
LDLC SERPL CALC-MCNC: 108 MG/DL (ref 0–99)
LYMPHOCYTES # BLD AUTO: 2.5 X10E3/UL (ref 0.7–3.1)
LYMPHOCYTES NFR BLD AUTO: 32 %
MCH RBC QN AUTO: 30.3 PG (ref 26.6–33)
MCHC RBC AUTO-ENTMCNC: 34 G/DL (ref 31.5–35.7)
MCV RBC AUTO: 89 FL (ref 79–97)
MONOCYTES # BLD AUTO: 0.7 X10E3/UL (ref 0.1–0.9)
MONOCYTES NFR BLD AUTO: 9 %
NEUTROPHILS # BLD AUTO: 3.7 X10E3/UL (ref 1.4–7)
NEUTROPHILS NFR BLD AUTO: 48 %
PLATELET # BLD AUTO: 173 X10E3/UL (ref 150–450)
POTASSIUM SERPL-SCNC: 4.9 MMOL/L (ref 3.5–5.2)
PROT SERPL-MCNC: 6.9 G/DL (ref 6–8.5)
PROTHROMBIN TIME: 10.8 SEC (ref 9.1–12)
PSA SERPL-MCNC: 0.8 NG/ML (ref 0–4)
RBC # BLD AUTO: 5.21 X10E6/UL (ref 4.14–5.8)
REFLEX CRITERIA: NORMAL
SODIUM SERPL-SCNC: 139 MMOL/L (ref 134–144)
TIBC SERPL-MCNC: 281 UG/DL (ref 250–450)
TRIGL SERPL-MCNC: 147 MG/DL (ref 0–149)
TSH SERPL DL<=0.005 MIU/L-ACNC: 1.38 UIU/ML (ref 0.45–4.5)
UIBC SERPL-MCNC: 180 UG/DL (ref 111–343)
VLDLC SERPL CALC-MCNC: 26 MG/DL (ref 5–40)
WBC # BLD AUTO: 7.7 X10E3/UL (ref 3.4–10.8)

## 2020-12-12 NOTE — PROGRESS NOTES
Lili - see if he is willing to start atorvastatin      RECOMMENDATIONS:  Thyroid screen negative. Liver and kidney function normal.  Blood counts and iron profile normal (not anemic). Clotting labs normal.  Prostate cancer screening normal.    LDL, or bad cholesterol, is elevated. This is the cholesterol that forms plaque in your arteries that leads to stroke and heart attack. With your history of peripheral artery disease (poor circulation in legs), you should be on a statin medication to help reduce your risk of stroke and heart attack. I would like for you to start taking atorvastatin 20mg daily. Continue to work on diet and exercise - Decrease intake of beef, lamb, pork (sausage, madrigal), foods with lard/shortening, dairy products with whole milk, saturated oils (coconut and palm oil), fried foods, desserts, sugary drinks, and packaged goods. Increase intake of fiber (oats, grant seeds, flaxseeds, barley, quinoa), fatty fish (salmon, sardines, trout, mackerel, tuna), lean meats (pork tenderloin, turkey, sirloin steak), nuts (especially almonds and walnuts), seeds, beans, vegetables and dark leafy greens (spinach/kale), skin of fruits, berries, olive oil, avocados/avocado oil, canola oil. Eat leaner meats, low-fat/fat-free dairy products, and yogurts. I would like to get your LDL levels below 70.

## 2020-12-14 ENCOUNTER — TELEPHONE (OUTPATIENT)
Dept: FAMILY MEDICINE CLINIC | Age: 55
End: 2020-12-14

## 2020-12-14 RX ORDER — ATORVASTATIN CALCIUM 20 MG/1
20 TABLET, FILM COATED ORAL DAILY
Qty: 90 TAB | Refills: 3 | Status: SHIPPED | OUTPATIENT
Start: 2020-12-14 | End: 2022-01-05

## 2020-12-14 NOTE — TELEPHONE ENCOUNTER
----- Message from Shreyas Carty NP sent at 12/12/2020  5:25 PM EST -----  Leellen Litten - see if he is willing to start atorvastatin      RECOMMENDATIONS:  Thyroid screen negative. Liver and kidney function normal.  Blood counts and iron profile normal (not anemic). Clotting labs normal.  Prostate cancer screening normal.    LDL, or bad cholesterol, is elevated. This is the cholesterol that forms plaque in your arteries that leads to stroke and heart attack. With your history of peripheral artery disease (poor circulation in legs), you should be on a statin medication to help reduce your risk of stroke and heart attack. I would like for you to start taking atorvastatin 20mg daily. Continue to work on diet and exercise - Decrease intake of beef, lamb, pork (sausage, madrigal), foods with lard/shortening, dairy products with whole milk, saturated oils (coconut and palm oil), fried foods, desserts, sugary drinks, and packaged goods. Increase intake of fiber (oats, grant seeds, flaxseeds, barley, quinoa), fatty fish (salmon, sardines, trout, mackerel, tuna), lean meats (pork tenderloin, turkey, sirloin steak), nuts (especially almonds and walnuts), seeds, beans, vegetables and dark leafy greens (spinach/kale), skin of fruits, berries, olive oil, avocados/avocado oil, canola oil. Eat leaner meats, low-fat/fat-free dairy products, and yogurts. I would like to get your LDL levels below 70.

## 2020-12-14 NOTE — TELEPHONE ENCOUNTER
Verified patient with two type of identifiers. Informed pt of lab results and/or prescription(s). Pt verbalized understanding. Pt willing to start chol medication.

## 2020-12-14 NOTE — TELEPHONE ENCOUNTER
,  Orders Placed This Encounter    atorvastatin (LIPITOR) 20 mg tablet     Sig: Take 1 Tab by mouth daily.      Dispense:  90 Tab     Refill:  3

## 2020-12-26 DIAGNOSIS — N52.9 ERECTILE DYSFUNCTION, UNSPECIFIED ERECTILE DYSFUNCTION TYPE: ICD-10-CM

## 2020-12-27 RX ORDER — SILDENAFIL 100 MG/1
TABLET, FILM COATED ORAL
Qty: 30 TAB | Refills: 4 | Status: SHIPPED | OUTPATIENT
Start: 2020-12-27 | End: 2022-10-18 | Stop reason: SDUPTHER

## 2021-02-19 DIAGNOSIS — I10 ESSENTIAL HYPERTENSION, BENIGN: ICD-10-CM

## 2021-02-19 RX ORDER — AMLODIPINE BESYLATE 5 MG/1
TABLET ORAL
Qty: 90 TAB | Refills: 3 | Status: SHIPPED | OUTPATIENT
Start: 2021-02-19 | End: 2022-03-01

## 2021-02-19 NOTE — TELEPHONE ENCOUNTER
Last visit:10/29/20  Next visit: not scheduled  Previous refill 12.10/19 (90+3R)    Requested Prescriptions     Pending Prescriptions Disp Refills    amLODIPine (NORVASC) 5 mg tablet 90 Tab 1     Sig: TAKE 1 TABLET BY MOUTH EVERY DAY

## 2021-03-16 DIAGNOSIS — Z72.0 TOBACCO ABUSE: ICD-10-CM

## 2021-03-16 RX ORDER — IBUPROFEN 200 MG
TABLET ORAL
Qty: 28 PATCH | Refills: 2 | Status: SHIPPED | OUTPATIENT
Start: 2021-03-16

## 2021-06-10 ENCOUNTER — OFFICE VISIT (OUTPATIENT)
Dept: FAMILY MEDICINE CLINIC | Age: 56
End: 2021-06-10

## 2021-06-10 VITALS
RESPIRATION RATE: 12 BRPM | SYSTOLIC BLOOD PRESSURE: 139 MMHG | OXYGEN SATURATION: 100 % | TEMPERATURE: 97.7 F | DIASTOLIC BLOOD PRESSURE: 77 MMHG | WEIGHT: 173 LBS | HEART RATE: 69 BPM | BODY MASS INDEX: 27.15 KG/M2 | HEIGHT: 67 IN

## 2021-06-10 DIAGNOSIS — Z72.0 TOBACCO ABUSE: ICD-10-CM

## 2021-06-10 DIAGNOSIS — I73.9 PAD (PERIPHERAL ARTERY DISEASE) (HCC): ICD-10-CM

## 2021-06-10 DIAGNOSIS — I10 ESSENTIAL HYPERTENSION, BENIGN: Primary | ICD-10-CM

## 2021-06-10 PROCEDURE — 99214 OFFICE O/P EST MOD 30 MIN: CPT | Performed by: NURSE PRACTITIONER

## 2021-06-10 RX ORDER — AMOXICILLIN 250 MG/1
250 CAPSULE ORAL 3 TIMES DAILY
COMMUNITY
End: 2021-09-10 | Stop reason: ALTCHOICE

## 2021-06-10 NOTE — PROGRESS NOTES
Chief Complaint   Patient presents with    Hypertension     1. Have you been to the ER, urgent care clinic since your last visit? Hospitalized since your last visit? No    2. Have you seen or consulted any other health care providers outside of the [address] [place] [address] since your last visit? Include any pap smears or colon screening.  No     Colonoscopy - [facility], pt told stomach is \"bruised\"     Health Maintenance Due   Topic Date Due    Pneumococcal 0-64 years (1 of 2 - PPSV23) Never done    COVID-19 Vaccine (1) Never done    Shingrix Vaccine Age 50> (1 of 2) Never done    Colorectal Cancer Screening Combo  08/13/2020

## 2021-06-10 NOTE — PROGRESS NOTES
Harman Oropeza (: 1965) is a 64 y.o. male, established patient, here for evaluation of the following chief complaint(s):  Hypertension       ASSESSMENT/PLAN:  Below is the assessment and plan developed based on review of pertinent history, physical exam, labs, studies, and medications. 1. Essential hypertension, benign - stable. -     LIPID PANEL; Future  -     METABOLIC PANEL, COMPREHENSIVE; Future  2. Tobacco abuse - encouraged smoking cessation  3. PAD (peripheral artery disease) ([facility] [address] - patient has stopped Pletal but still taking ASA 81mg. Patient to see vascular on 21. Will defer restarting Pletal with them. Patient is working on smoking cessation, he is down to 4 cigs daily.  -     LIPID PANEL; Future  -     METABOLIC PANEL, COMPREHENSIVE; Future      Follow up 6  months    SUBJECTIVE/OBJECTIVE:  HPI   Patient comes in today for follow up HTN, PAD    Stopped taking Pletal 6 months ago. Has appt on Monday with vascular. Trying to eat healthier. Not eating a lot of fried foods. Trying to quit smoking. Uses patches. Has cut back - does not smoke at work. Has 4 cigs daily. Was seen by vascular in 2020, dx with PAD, had stent placed in leg. Was taking Plavix but has been discontinued. Still taking ASA 81mg and Pletal.  States he has ability to walk without claudication pain. Colonoscopy - [facility], told he had H pylori test positive. Taking amoxicillin, clindamycin and omeprazole. Will start tomorrow    Taking amlodipine for HTN. Compliant w/ meds, low salt diet, and exercise. No swelling, headache or dizziness. No chest pain, SOB, palpitations.   Otherwise feeling well since the last visit  No Known Allergies    Past Medical History:   Diagnosis Date    Esophagitis     HTN (hypertension) 2016    IGT (impaired glucose tolerance)        Past Surgical History:   Procedure Laterality Date    HX COLONOSCOPY  2015    polyps       Social History     Socioeconomic History    Marital status: LEGALLY      Spouse name: Not on file    Number of children: Not on file    Years of education: Not on file    Highest education level: Not on file   Occupational History    Not on file   Tobacco Use    Smoking status: Current Every Day Smoker     Packs/day: 0.25     Types: Cigarettes    Smokeless tobacco: Never Used   Vaping Use    Vaping Use: Never used   Substance and Sexual Activity    Alcohol use: Yes     Comment: socially    Drug use: Not Currently    Sexual activity: Not Currently   Other Topics Concern    Not on file   Social History Narrative    2 biological (27yo son and 30y daughter)  and 1 stepson, age 28y . Works for [employer] as , part-time at [employer]. Social Determinants of Health     Financial Resource Strain:     Difficulty of Paying Living Expenses:    Food Insecurity:     Worried About Running Out of Food in the Last Year:     [address] in the Last Year:    Transportation Needs:     Lack of Transportation (Medical):      Lack of Transportation (Non-Medical):    Physical Activity:     Days of Exercise per Week:     Minutes of Exercise per Session:    Stress:     Feeling of Stress :    Social Connections:     Frequency of Communication with Friends and Family:     Frequency of Social Gatherings with Friends and Family:     Attends Scientologist Services:     Active Member of Clubs or Organizations:     Attends Club or Organization Meetings:     Marital Status:    Intimate Partner Violence:     Fear of Current or Ex-Partner:     Emotionally Abused:     Physically Abused:     Sexually Abused:        Family History   Problem Relation Age of Onset    Arthritis-osteo Father     Cataract Father     Other Mother 58    Stroke Mother     Diabetes Mother     Hypertension Mother     Hypertension Sister     Diabetes Brother     Kidney Disease Brother         on HD,  in late 58s    Heart Disease Brother         CABG,  in late 62s    Seizures Brother     Hypertension Sister     Hypertension Sister     Hypertension Sister        Current Outpatient Medications   Medication Sig    amoxicillin (AMOXIL) 250 mg capsule Take 250 mg by mouth three (3) times daily.  nicotine (NICODERM CQ) 14 mg/24 hr patch APPLY 1 PATCH TRANSDERMALLY EVERY 24 HOURS    amLODIPine (NORVASC) 5 mg tablet TAKE 1 TABLET BY MOUTH EVERY DAY    sildenafil citrate (VIAGRA) 100 mg tablet TAKE ONE TABLET BY MOUTH DAILY AS NEEDED ( TAKE 30 TO 60 MINUTES PRIOR TO SEX ON EMPTY STOMACH)    atorvastatin (LIPITOR) 20 mg tablet Take 1 Tab by mouth daily.  omeprazole (PRILOSEC) 20 mg capsule TAKE 1 CAPSULE BY MOUTH EVERY DAY    aspirin delayed-release 81 mg tablet Take 1 Tab by mouth daily. No current facility-administered medications for this visit. Review of Systems   Constitutional: Negative for appetite change, chills, fatigue, fever and unexpected weight change. Respiratory: Negative for cough and shortness of breath. Cardiovascular: Negative for chest pain, palpitations and leg swelling. Gastrointestinal: Negative for abdominal pain, constipation, diarrhea, nausea and vomiting. Endocrine: Negative for polydipsia, polyphagia and polyuria. Genitourinary: Negative for dysuria, frequency and urgency. Musculoskeletal: Negative. Skin: Negative. Neurological: Negative for dizziness, light-headedness, numbness and headaches. Psychiatric/Behavioral: Negative for dysphoric mood and sleep disturbance. The patient is not nervous/anxious. Vitals:    06/10/21 1503   BP: 139/77   Pulse: 69   Resp: 12   Temp: 97.7 °F (36.5 °C)   TempSrc: Oral   SpO2: 100%   Weight: 173 lb (78.5 kg)   Height: 5' 7\" (1.702 m)       Physical Exam  Constitutional:       Appearance: Normal appearance. He is well-developed.    HENT:      Right Ear: Hearing, tympanic membrane, ear canal and external ear normal.      Left Ear: Hearing, tympanic membrane, ear canal and external ear normal.      Nose: Nose normal.      Mouth/Throat:      Pharynx: Uvula midline. Cardiovascular:      Rate and Rhythm: Normal rate and regular rhythm. Heart sounds: Normal heart sounds. Pulmonary:      Effort: Pulmonary effort is normal.      Breath sounds: Normal breath sounds. Musculoskeletal:      Right lower leg: No edema. Left lower leg: No edema. Lymphadenopathy:      Head:      Right side of head: No submental, submandibular or tonsillar adenopathy. Left side of head: No submental, submandibular or tonsillar adenopathy. Cervical: No cervical adenopathy. Skin:     General: Skin is warm and dry. Neurological:      Mental Status: He is alert and oriented to person, place, and time. Psychiatric:         Behavior: Behavior normal. Behavior is cooperative. Thought Content: Thought content normal.         Judgment: Judgment normal.       On this date 06/10/2021 I have spent 30 minutes reviewing previous notes, test results and face to face with the patient discussing the diagnosis and importance of compliance with the treatment plan as well as documenting on the day of the visit. An electronic signature was used to authenticate this note.   -- Reina De La Rosa NP

## 2021-06-11 ENCOUNTER — CLINICAL SUPPORT (OUTPATIENT)
Dept: FAMILY MEDICINE CLINIC | Age: 56
End: 2021-06-11

## 2021-06-11 DIAGNOSIS — Z01.89 ROUTINE LAB DRAW: Primary | ICD-10-CM

## 2021-06-11 LAB
ALBUMIN SERPL-MCNC: 3.9 G/DL (ref 3.5–5)
ALBUMIN/GLOB SERPL: 1.3 {RATIO} (ref 1.1–2.2)
ALP SERPL-CCNC: 100 U/L (ref 45–117)
ALT SERPL-CCNC: 38 U/L (ref 12–78)
ANION GAP SERPL CALC-SCNC: 4 MMOL/L (ref 5–15)
AST SERPL-CCNC: 19 U/L (ref 15–37)
BILIRUB SERPL-MCNC: 0.5 MG/DL (ref 0.2–1)
BUN SERPL-MCNC: 16 MG/DL (ref 6–20)
BUN/CREAT SERPL: 14 (ref 12–20)
CALCIUM SERPL-MCNC: 9.2 MG/DL (ref 8.5–10.1)
CHLORIDE SERPL-SCNC: 107 MMOL/L (ref 97–108)
CHOLEST SERPL-MCNC: 137 MG/DL
CO2 SERPL-SCNC: 29 MMOL/L (ref 21–32)
CREAT SERPL-MCNC: 1.15 MG/DL (ref 0.7–1.3)
GLOBULIN SER CALC-MCNC: 3.1 G/DL (ref 2–4)
GLUCOSE SERPL-MCNC: 85 MG/DL (ref 65–100)
HDLC SERPL-MCNC: 60 MG/DL
HDLC SERPL: 2.3 {RATIO} (ref 0–5)
LDLC SERPL CALC-MCNC: 61.4 MG/DL (ref 0–100)
POTASSIUM SERPL-SCNC: 4.4 MMOL/L (ref 3.5–5.1)
PROT SERPL-MCNC: 7 G/DL (ref 6.4–8.2)
SODIUM SERPL-SCNC: 140 MMOL/L (ref 136–145)
TRIGL SERPL-MCNC: 78 MG/DL (ref ?–150)
VLDLC SERPL CALC-MCNC: 15.6 MG/DL

## 2021-06-11 NOTE — PROGRESS NOTES
Diamond Grady (: 1965) is a 64 y.o. male, established patient, here for evaluation of the following chief complaint(s):  Labs Only     LAB ORDERS ON 6/10/21 VISIT    An electronic signature was used to authenticate this note.   -- Patrizia Godwin NP

## 2021-06-18 ENCOUNTER — DOCUMENTATION ONLY (OUTPATIENT)
Dept: FAMILY MEDICINE CLINIC | Age: 56
End: 2021-06-18

## 2021-06-18 NOTE — PROGRESS NOTES
Received report from [facility] of pt's completed colonoscopy, repeat 5 years dated 3/30/21. Given to Marcelo Qualia, NP to sign and send to central scanning.

## 2021-09-10 ENCOUNTER — OFFICE VISIT (OUTPATIENT)
Dept: FAMILY MEDICINE CLINIC | Age: 56
End: 2021-09-10
Payer: COMMERCIAL

## 2021-09-10 VITALS
RESPIRATION RATE: 16 BRPM | WEIGHT: 169.4 LBS | DIASTOLIC BLOOD PRESSURE: 78 MMHG | BODY MASS INDEX: 26.59 KG/M2 | SYSTOLIC BLOOD PRESSURE: 138 MMHG | HEIGHT: 67 IN | HEART RATE: 64 BPM | TEMPERATURE: 98.6 F | OXYGEN SATURATION: 100 %

## 2021-09-10 DIAGNOSIS — Z23 ENCOUNTER FOR IMMUNIZATION: ICD-10-CM

## 2021-09-10 DIAGNOSIS — S71.111A LACERATION OF SKIN OF RIGHT THIGH, INITIAL ENCOUNTER: Primary | ICD-10-CM

## 2021-09-10 PROCEDURE — 99213 OFFICE O/P EST LOW 20 MIN: CPT | Performed by: STUDENT IN AN ORGANIZED HEALTH CARE EDUCATION/TRAINING PROGRAM

## 2021-09-10 PROCEDURE — 90471 IMMUNIZATION ADMIN: CPT | Performed by: STUDENT IN AN ORGANIZED HEALTH CARE EDUCATION/TRAINING PROGRAM

## 2021-09-10 PROCEDURE — 90714 TD VACC NO PRESV 7 YRS+ IM: CPT | Performed by: STUDENT IN AN ORGANIZED HEALTH CARE EDUCATION/TRAINING PROGRAM

## 2021-09-10 RX ORDER — GENTAMICIN SULFATE 1 MG/G
OINTMENT TOPICAL
Qty: 30 G | Refills: 0 | Status: SHIPPED | OUTPATIENT
Start: 2021-09-10 | End: 2021-12-10 | Stop reason: ALTCHOICE

## 2021-09-10 NOTE — PROGRESS NOTES
Room: 1    Identified pt with two pt identifiers(name and ). Reviewed record in preparation for visit and have obtained necessary documentation. All patient medications has been reviewed. Chief Complaint   Patient presents with   Derinda Boxer     fell last night; missed step; fell over toñito bench    Bleeding/Bruising     right thigh; slight cut in area of bruise       Health Maintenance Due   Topic    Pneumococcal 0-64 years (1 of 2 - PPSV23)    Shingrix Vaccine Age 50> (1 of 2)    Flu Vaccine (1)       Vitals:    09/10/21 1119   BP: (!) 158/84   Pulse: (!) 58   Resp: 16   Temp: 98.6 °F (37 °C)   TempSrc: Oral   SpO2: 100%   Weight: 169 lb 6.4 oz (76.8 kg)   Height: 5' 7\" (1.702 m)   PainSc:   5   PainLoc: Leg       4. Have you been to the ER, urgent care clinic since your last visit? Hospitalized since your last visit? No    5. Have you seen or consulted any other health care providers outside of the [address] [place] [address] since your last visit? Include any pap smears or colon screening. No    Patient is accompanied by self I have received verbal consent from Gela Kim to discuss any/all medical information while they are present in the room.

## 2021-09-10 NOTE — PROGRESS NOTES
[address]  [address] [facility], [facility]  [phone]    Chief Complaint: Fall    Subjective  Aundra Kenyon is a 64 y.o. male who presents today for evaluation of fall. Pt states that he missed a step last night at home and fell over a toñito bench leading to laceration of the right thigh. He applied gauze to the wound. Pt  denies hitting the face against the floor. The patient denies any loss of consciousness, diplopia, unilateral disturbance of motor or sensory function, dizziness, head injury, striking chest/abdomen on the ground or any visible extremity lacerations. No fever or chills    Allergies - reviewed:   No Known Allergies      Medications - reviewed:   Current Outpatient Medications   Medication Sig    gentamicin (GARAMYCIN) 0.1 % topical ointment Apply pee size to the wound surface daily    amLODIPine (NORVASC) 5 mg tablet TAKE 1 TABLET BY MOUTH EVERY DAY    sildenafil citrate (VIAGRA) 100 mg tablet TAKE ONE TABLET BY MOUTH DAILY AS NEEDED ( TAKE 30 TO 60 MINUTES PRIOR TO SEX ON EMPTY STOMACH)    atorvastatin (LIPITOR) 20 mg tablet Take 1 Tab by mouth daily.  aspirin delayed-release 81 mg tablet Take 1 Tab by mouth daily.  nicotine (NICODERM CQ) 14 mg/24 hr patch APPLY 1 PATCH TRANSDERMALLY EVERY 24 HOURS (Patient not taking: Reported on 9/10/2021)    omeprazole (PRILOSEC) 20 mg capsule TAKE 1 CAPSULE BY MOUTH EVERY DAY (Patient not taking: Reported on 9/10/2021)     No current facility-administered medications for this visit.        Family History - reviewed:  Family History   Problem Relation Age of Onset    Arthritis-osteo Father     Cataract Father     Other Mother 58    Stroke Mother     Diabetes Mother     Hypertension Mother     Hypertension Sister     Diabetes Brother     Kidney Disease Brother         on HD,  in late 58s    Heart Disease Brother         CABG,  in late 62s    Seizures Brother  Hypertension Sister     Hypertension Sister     Hypertension Sister          Past Medical History - reviewed:  Past Medical History:   Diagnosis Date    Esophagitis     HTN (hypertension) 2/2016    IGT (impaired glucose tolerance)        Review of systems:     A comprehensive review of systems was negative except for that written in the History of Present Illness. Physical Exam  Visit Vitals  /78 (BP 1 Location: Right upper arm, BP Patient Position: Sitting, BP Cuff Size: Adult)   Pulse 64   Temp 98.6 °F (37 °C) (Oral)   Resp 16   Ht 5' 7\" (1.702 m)   Wt 169 lb 6.4 oz (76.8 kg)   SpO2 100%   BMI 26.53 kg/m²       General: Alert and oriented, in no acute distress. Well nourished. LUNGS: Respirations unlabored; clear to auscultation bilaterally. CARDIOVASCULAR: Regular, rate, and rhythm without murmurs, gallops or rubs. EXT: Neurovascularlly intact. Normal gait. Superficial laceration of the right thigh. No foreign body noted. Assessment/Plan    ICD-10-CM ICD-9-CM    1. Laceration of skin of right thigh, initial encounter  S71.111A 890.0 gentamicin (GARAMYCIN) 0.1 % topical ointment   2. Encounter for immunization  Z23 V03.89 TETANUS AND DIPHTHERIA TOXOIDS (TD) ADSORBED, PRES. FREE, IN INDIVIDS. >=7, IM      gentamicin (GARAMYCIN) 0.1 % topical ointment         1. Laceration of skin of right thigh, initial encounter  No evidence of wound infection at this time. Laceration not warranting sutures at this time. - Cleaned wound surface and new bandaid applied  - recommend daily cleanse with normal saline, wipe wound bed clean and dry  - gentamicin (GARAMYCIN) 0.1 % topical ointment; Apply pee size to the wound surface daily  - cover with dressing; calcium alginate-honey (MediHoney, mariaelena alginate-honey,) 4 X 5 \" bndg;   - Tetanus vaccine administered  - Call or RTC immediately if any signs of infection such as; Worsening pain, redness, fever, chills, pus etc    2.  Encounter for immunization  - TETANUS AND DIPHTHERIA TOXOIDS (TD) ADSORBED, PRES. FREE, IN INDIVIDS. >=7, IM    Follow up: 4 weeks or sooner if needed. We discussed the expected course, resolution and complications of the diagnosis(es) in detail. Medication risks, benefits, costs, interactions, and alternatives were discussed as indicated. I advised him to contact the office if his condition worsens, changes or fails to improve as anticipated. He expressed understanding with the diagnosis(es) and plan. Patient understands that this encounter was a temporary measure, and the importance of further follow up and examination was emphasized. Patient verbalized understanding.       Signed By: Janet Hidalgo MD     September 10, 2021

## 2021-09-10 NOTE — PATIENT INSTRUCTIONS
WOUND CARE:   Daily cleanse with normal saline, wipe wound bed clean and dry    - Apply gentamicin (GARAMYCIN) 0.1 % topical ointment; pee size to the wound surface daily, then   - cover with dressing; calcium alginate-honey (MediHoney, mariaelena alginate-honey,) 4 X 5 \" bndg;  In you get the adhesive one then you do not need an extra bandaid.   - Notify me immediately if worsening redness, pain, fever or chills

## 2021-12-10 ENCOUNTER — OFFICE VISIT (OUTPATIENT)
Dept: FAMILY MEDICINE CLINIC | Age: 56
End: 2021-12-10
Payer: COMMERCIAL

## 2021-12-10 VITALS
HEIGHT: 67 IN | RESPIRATION RATE: 12 BRPM | TEMPERATURE: 98 F | HEART RATE: 58 BPM | BODY MASS INDEX: 26.43 KG/M2 | OXYGEN SATURATION: 100 % | DIASTOLIC BLOOD PRESSURE: 82 MMHG | WEIGHT: 168.4 LBS | SYSTOLIC BLOOD PRESSURE: 132 MMHG

## 2021-12-10 DIAGNOSIS — R35.0 URINARY FREQUENCY: ICD-10-CM

## 2021-12-10 DIAGNOSIS — Z00.00 ROUTINE GENERAL MEDICAL EXAMINATION AT A HEALTH CARE FACILITY: Primary | ICD-10-CM

## 2021-12-10 DIAGNOSIS — Z23 ENCOUNTER FOR ADMINISTRATION OF COVID-19 VACCINE: ICD-10-CM

## 2021-12-10 DIAGNOSIS — I73.9 PAD (PERIPHERAL ARTERY DISEASE) (HCC): ICD-10-CM

## 2021-12-10 DIAGNOSIS — Z23 NEEDS FLU SHOT: ICD-10-CM

## 2021-12-10 DIAGNOSIS — Z12.5 PROSTATE CANCER SCREENING: ICD-10-CM

## 2021-12-10 DIAGNOSIS — I10 ESSENTIAL HYPERTENSION, BENIGN: ICD-10-CM

## 2021-12-10 LAB
ALBUMIN SERPL-MCNC: 4.2 G/DL (ref 3.5–5)
ALBUMIN/GLOB SERPL: 1.3 {RATIO} (ref 1.1–2.2)
ALP SERPL-CCNC: 121 U/L (ref 45–117)
ALT SERPL-CCNC: 27 U/L (ref 12–78)
ANION GAP SERPL CALC-SCNC: 5 MMOL/L (ref 5–15)
APPEARANCE UR: CLEAR
AST SERPL-CCNC: 15 U/L (ref 15–37)
BILIRUB SERPL-MCNC: 0.5 MG/DL (ref 0.2–1)
BILIRUB UR QL: NEGATIVE
BUN SERPL-MCNC: 10 MG/DL (ref 6–20)
BUN/CREAT SERPL: 10 (ref 12–20)
CALCIUM SERPL-MCNC: 9.7 MG/DL (ref 8.5–10.1)
CHLORIDE SERPL-SCNC: 106 MMOL/L (ref 97–108)
CHOLEST SERPL-MCNC: 128 MG/DL
CO2 SERPL-SCNC: 27 MMOL/L (ref 21–32)
COLOR UR: NORMAL
CREAT SERPL-MCNC: 1.04 MG/DL (ref 0.7–1.3)
ERYTHROCYTE [DISTWIDTH] IN BLOOD BY AUTOMATED COUNT: 14.2 % (ref 11.5–14.5)
EST. AVERAGE GLUCOSE BLD GHB EST-MCNC: 111 MG/DL
GLOBULIN SER CALC-MCNC: 3.2 G/DL (ref 2–4)
GLUCOSE SERPL-MCNC: 84 MG/DL (ref 65–100)
GLUCOSE UR STRIP.AUTO-MCNC: NEGATIVE MG/DL
HBA1C MFR BLD: 5.5 % (ref 4–5.6)
HCT VFR BLD AUTO: 44.2 % (ref 36.6–50.3)
HDLC SERPL-MCNC: 58 MG/DL
HDLC SERPL: 2.2 {RATIO} (ref 0–5)
HGB BLD-MCNC: 15.6 G/DL (ref 12.1–17)
HGB UR QL STRIP: NEGATIVE
KETONES UR QL STRIP.AUTO: NEGATIVE MG/DL
LDLC SERPL CALC-MCNC: 57 MG/DL (ref 0–100)
LEUKOCYTE ESTERASE UR QL STRIP.AUTO: NEGATIVE
MCH RBC QN AUTO: 31.5 PG (ref 26–34)
MCHC RBC AUTO-ENTMCNC: 35.3 G/DL (ref 30–36.5)
MCV RBC AUTO: 89.3 FL (ref 80–99)
NITRITE UR QL STRIP.AUTO: NEGATIVE
NRBC # BLD: 0 K/UL (ref 0–0.01)
NRBC BLD-RTO: 0 PER 100 WBC
PH UR STRIP: 5 [PH] (ref 5–8)
PLATELET # BLD AUTO: 214 K/UL (ref 150–400)
PMV BLD AUTO: 10.6 FL (ref 8.9–12.9)
POTASSIUM SERPL-SCNC: 4.3 MMOL/L (ref 3.5–5.1)
PROT SERPL-MCNC: 7.4 G/DL (ref 6.4–8.2)
PROT UR STRIP-MCNC: NEGATIVE MG/DL
RBC # BLD AUTO: 4.95 M/UL (ref 4.1–5.7)
SODIUM SERPL-SCNC: 138 MMOL/L (ref 136–145)
SP GR UR REFRACTOMETRY: 1.01 (ref 1–1.03)
TRIGL SERPL-MCNC: 65 MG/DL (ref ?–150)
UROBILINOGEN UR QL STRIP.AUTO: 0.2 EU/DL (ref 0.2–1)
VLDLC SERPL CALC-MCNC: 13 MG/DL
WBC # BLD AUTO: 9.3 K/UL (ref 4.1–11.1)

## 2021-12-10 PROCEDURE — 91300 COVID-19, MRNA, LNP-S, PF, 30MCG/0.3ML DOSE(PFIZER): CPT | Performed by: NURSE PRACTITIONER

## 2021-12-10 PROCEDURE — 90471 IMMUNIZATION ADMIN: CPT | Performed by: NURSE PRACTITIONER

## 2021-12-10 PROCEDURE — 99396 PREV VISIT EST AGE 40-64: CPT | Performed by: NURSE PRACTITIONER

## 2021-12-10 PROCEDURE — 90686 IIV4 VACC NO PRSV 0.5 ML IM: CPT | Performed by: NURSE PRACTITIONER

## 2021-12-10 PROCEDURE — 0003A COVID-19, MRNA, LNP-S, PF, 30MCG/0.3ML DOSE(PFIZER): CPT | Performed by: NURSE PRACTITIONER

## 2021-12-10 NOTE — PATIENT INSTRUCTIONS
Vaccine Information Statement    Influenza (Flu) Vaccine (Inactivated or Recombinant): What You Need to Know    Many vaccine information statements are available in Frisian and other languages. See www.immunize.org/vis. Hojas de información sobre vacunas están disponibles en español y en muchos otros idiomas. Visite www.immunize.org/vis. 1. Why get vaccinated? Influenza vaccine can prevent influenza (flu). Flu is a contagious disease that spreads around the [facility] every year, usually between October and May. Anyone can get the flu, but it is more dangerous for some people. Infants and young children, people 72 years and older, pregnant people, and people with certain health conditions or a weakened immune system are at greatest risk of flu complications. Pneumonia, bronchitis, sinus infections, and ear infections are examples of flu-related complications. If you have a medical condition, such as heart disease, cancer, or diabetes, flu can make it worse. Flu can cause fever and chills, sore throat, muscle aches, fatigue, cough, headache, and runny or stuffy nose. Some people may have vomiting and diarrhea, though this is more common in children than adults. In an average year, thousands of people in the [facility] die from flu, and many more are hospitalized. Flu vaccine prevents millions of illnesses and flu-related visits to the doctor each year. 2. Influenza vaccines     CDC recommends everyone 6 months and older get vaccinated every flu season. Children 6 months through 6years of age may need 2 doses during a single flu season. Everyone else needs only 1 dose each flu season. It takes about 2 weeks for protection to develop after vaccination. There are many flu viruses, and they are always changing. Each year a new flu vaccine is made to protect against the influenza viruses believed to be likely to cause disease in the upcoming flu season.  Even when the vaccine doesnt exactly match these viruses, it may still provide some protection. Influenza vaccine does not cause flu. Influenza vaccine may be given at the same time as other vaccines. 3. Talk with your health care provider    Tell your vaccination provider if the person getting the vaccine:   Has had an allergic reaction after a previous dose of influenza vaccine, or has any severe, life-threatening allergies    Has ever had Guillain-Barré Syndrome (also called GBS)    In some cases, your health care provider may decide to postpone influenza vaccination until a future visit. Influenza vaccine can be administered at any time during pregnancy. People who are or will be pregnant during influenza season should receive inactivated influenza vaccine. People with minor illnesses, such as a cold, may be vaccinated. People who are moderately or severely ill should usually wait until they recover before getting influenza vaccine. Your health care provider can give you more information. 4. Risks of a vaccine reaction     Soreness, redness, and swelling where the shot is given, fever, muscle aches, and headache can happen after influenza vaccination.  There may be a very small increased risk of Guillain-Barré Syndrome (GBS) after inactivated influenza vaccine (the flu shot). Janeal Ray children who get the flu shot along with pneumococcal vaccine (PCV13) and/or DTaP vaccine at the same time might be slightly more likely to have a seizure caused by fever. Tell your health care provider if a child who is getting flu vaccine has ever had a seizure. People sometimes faint after medical procedures, including vaccination. Tell your provider if you feel dizzy or have vision changes or ringing in the ears. As with any medicine, there is a very remote chance of a vaccine causing a severe allergic reaction, other serious injury, or death. 5. What if there is a serious problem?     An allergic reaction could occur after the vaccinated person leaves the clinic. If you see signs of a severe allergic reaction (hives, swelling of the face and throat, difficulty breathing, a fast heartbeat, dizziness, or weakness), call 9-1-1 and get the person to the nearest hospital.    For other signs that concern you, call your health care provider. Adverse reactions should be reported to the Vaccine Adverse Event Reporting System (VAERS). Your health care provider will usually file this report, or you can do it yourself. Visit the VAERS website at www.vaers. [facility].gov or call 9-407.113.2511. VAERS is only for reporting reactions, and VAERS staff members do not give medical advice. 6. The National Vaccine Injury Compensation Program    The [facility] Vaccine Injury Compensation Program (VICP) is a federal program that was created to compensate people who may have been injured by certain vaccines. Claims regarding alleged injury or death due to vaccination have a time limit for filing, which may be as short as two years. Visit the VICP website at www.[facility].gov/vaccinecompensation or call 4-244.880.2142 to learn about the program and about filing a claim. 7. How can I learn more?  Ask your health care provider.  Call your local or state health department.  Visit the website of the Food and Drug Administration (FDA) for vaccine package inserts and additional information at www.fda.gov/vaccines-blood-biologics/vaccines.  Contact the Centers for Disease Control and Prevention (CDC):  - Call 3-257.877.4806 (1-800-CDC-INFO) or  - Visit CDCs influenza website at www.cdc.gov/flu. Vaccine Information Statement   Inactivated Influenza Vaccine   8/6/2021  [address] [facility] [id number]   Department of Health and Human Services  Centers for Disease Control and Prevention    Office Use Only

## 2021-12-10 NOTE — PROGRESS NOTES
Chief Complaint   Patient presents with    Hypertension       1. Have you been to the ER, urgent care clinic since your last visit? Hospitalized since your last visit? No    2. Have you seen or consulted any other health care providers outside of the [address] [place] [address] since your last visit? Include any pap smears or colon screening. No     Pfizer Booster - Pt accepts   Flu shot - Pt accepts   Verbal Order with Readback given by Obdulio Cowart NP for Influenza. Given in Left Deltoid without difficulty. Verbal Order with Readback given by Obdulio Cowart NP for Pfizer COVID-19 Booster, 0.3 mL. Given in Right deltoid without difficulty. Pt monitored for 15 minutes with no reaction.     Health Maintenance Due   Topic Date Due    Pneumococcal 0-64 years (1 of 2 - PPSV23) Never done    Shingrix Vaccine Age 50> (1 of 2) Never done    Flu Vaccine (1) 09/01/2021    COVID-19 Vaccine (3 - Booster for Pfizer series) 11/30/2021

## 2021-12-10 NOTE — PROGRESS NOTES
Yuliana Jade (: 1965) is a 64 y.o. male, established patient, here for evaluation of the following chief complaint(s):  Hypertension       ASSESSMENT/PLAN:  Below is the assessment and plan developed based on review of pertinent history, physical exam, labs, studies, and medications. 1. Routine general medical examination at a health care facility  2. Essential hypertension, benign - stable  -     CBC W/O DIFF; Future  -     METABOLIC PANEL, COMPREHENSIVE; Future  -     HEMOGLOBIN A1C WITH EAG; Future  -     LIPID PANEL; Future  -     URINALYSIS W/ RFLX MICROSCOPIC; Future  3. PAD (peripheral artery disease) (HCC) - per vascular, on ASA, stopped pletal  4. Urinary frequency  -     HEMOGLOBIN A1C WITH EAG; Future  -     PSA W/ REFLX FREE PSA; Future  -     URINALYSIS W/ RFLX MICROSCOPIC; Future  5. Prostate cancer screening  -     PSA W/ REFLX FREE PSA; Future  6. Needs flu shot  -     INFLUENZA VIRUS VAC QUAD,SPLIT,PRESV FREE SYRINGE IM  7. Encounter for administration of COVID-19 vaccine  -     COVID-19, MRNA, LNP-S, PF, 30MCG/0.3ML DOSE(PFIZER)      Return in about 6 months (around 6/10/2022), or if symptoms worsen or fail to improve. SUBJECTIVE/OBJECTIVE:  HPI   Patient comes in today for follow up HTN    patient has stopped Pletal but still taking ASA 81mg for PAD. Has follow up in 2022 - Dr. Joe Roy)  He has picked up smoking. Smoking about 1/2 ppd now. Tried patches. Trying to eat healthier. Not eating a lot of fried foods. Has cut back - does not smoke at work. Has 4 cigs daily. dx with PAD, had stent placed in leg.  States he has ability to walk without claudication pain.     Colonoscopy - [facility], told he had H pylori test positive. Taking amoxicillin, clindamycin and omeprazole.     Taking amlodipine for HTN. Compliant w/ meds, low salt diet, and exercise. No swelling, headache or dizziness. No chest pain, SOB, palpitations.   Otherwise feeling well since the last visit  No Known Allergies    Past Medical History:   Diagnosis Date    Esophagitis     HTN (hypertension) 2/2016    IGT (impaired glucose tolerance)        Past Surgical History:   Procedure Laterality Date    HX COLONOSCOPY  8/2015    polyps       Social History     Socioeconomic History    Marital status: LEGALLY      Spouse name: Not on file    Number of children: Not on file    Years of education: Not on file    Highest education level: Not on file   Occupational History    Not on file   Tobacco Use    Smoking status: Current Every Day Smoker     Packs/day: 0.25     Types: Cigarettes    Smokeless tobacco: Never Used   Vaping Use    Vaping Use: Never used   Substance and Sexual Activity    Alcohol use: Yes     Comment: socially    Drug use: Not Currently    Sexual activity: Not Currently   Other Topics Concern    Not on file   Social History Narrative    2 biological (25yo son and 30y daughter)  and 1 stepson, age 28y . Works for [employer] as , part-time at [employer]. Has 4 grandchildren     Social Determinants of Health     Financial Resource Strain:     Difficulty of Paying Living Expenses: Not on file   Food Insecurity:     Worried About Running Out of Food in the Last Year: Not on file    Stephanie of Food in the Last Year: Not on file   Transportation Needs:     Lack of Transportation (Medical): Not on file    Lack of Transportation (Non-Medical):  Not on file   Physical Activity:     Days of Exercise per Week: Not on file    Minutes of Exercise per Session: Not on file   Stress:     Feeling of Stress : Not on file   Social Connections:     Frequency of Communication with Friends and Family: Not on file    Frequency of Social Gatherings with Friends and Family: Not on file    Attends Ep[employer]op[employer] Services: Not on file    Active Member of Clubs or Organizations: Not on file    Attends Club or Organization Meetings: Not on file    Marital Status: Not on file   Intimate Partner Violence:     Fear of Current or Ex-Partner: Not on file    Emotionally Abused: Not on file    Physically Abused: Not on file    Sexually Abused: Not on file   Housing Stability:     Unable to Pay for Housing in the Last Year: Not on file    Number of [place] in the Last Year: Not on file    Unstable Housing in the Last Year: Not on file       Family History   Problem Relation Age of Onset    OSTEOARTHRITIS Father     Cataract Father     Other Mother 58    Stroke Mother     Diabetes Mother     Hypertension Mother     Hypertension Sister     Diabetes Brother     Kidney Disease Brother         on HD,  in late 62s    Heart Disease Brother         CABG,  in late 62s    Seizures Brother     Hypertension Sister     Hypertension Sister     Hypertension Sister        Current Outpatient Medications   Medication Sig    amLODIPine (NORVASC) 5 mg tablet TAKE 1 TABLET BY MOUTH EVERY DAY    sildenafil citrate (VIAGRA) 100 mg tablet TAKE ONE TABLET BY MOUTH DAILY AS NEEDED ( TAKE 30 TO 60 MINUTES PRIOR TO SEX ON EMPTY STOMACH)    atorvastatin (LIPITOR) 20 mg tablet Take 1 Tab by mouth daily.  aspirin delayed-release 81 mg tablet Take 1 Tab by mouth daily.  nicotine (NICODERM CQ) 14 mg/24 hr patch APPLY 1 PATCH TRANSDERMALLY EVERY 24 HOURS (Patient not taking: Reported on 9/10/2021)     No current facility-administered medications for this visit. Review of Systems   Constitutional: Negative for appetite change, chills, fatigue, fever and unexpected weight change. Respiratory: Negative for cough and shortness of breath. Cardiovascular: Negative for chest pain, palpitations and leg swelling. Gastrointestinal: Negative for abdominal pain, constipation, diarrhea, nausea and vomiting. Endocrine: Negative for polydipsia, polyphagia and polyuria. Genitourinary: Negative for dysuria, frequency and urgency.    Musculoskeletal: Negative. Skin: Negative. Neurological: Negative for dizziness, light-headedness, numbness and headaches. Psychiatric/Behavioral: Negative for dysphoric mood and sleep disturbance. The patient is not nervous/anxious. Vitals:    12/10/21 1157 12/10/21 1201 12/10/21 1210   BP: (!) 143/77 (!) 152/88 132/82   Pulse: 60 (!) 58    Resp: 12     Temp: 98 °F (36.7 °C)     TempSrc: Oral     SpO2: 100%     Weight: 168 lb 6.4 oz (76.4 kg)     Height: 5' 7\" (1.702 m)       Physical Exam  Constitutional:       Appearance: Normal appearance. He is well-developed. HENT:      Right Ear: Hearing, tympanic membrane, ear canal and external ear normal.      Left Ear: Hearing, tympanic membrane, ear canal and external ear normal.      Nose: Nose normal.      Mouth/Throat:      Pharynx: Uvula midline. Cardiovascular:      Rate and Rhythm: Normal rate and regular rhythm. Heart sounds: Normal heart sounds. Pulmonary:      Effort: Pulmonary effort is normal.      Breath sounds: Normal breath sounds. Musculoskeletal:      Right lower leg: No edema. Left lower leg: No edema. Lymphadenopathy:      Head:      Right side of head: No submental, submandibular or tonsillar adenopathy. Left side of head: No submental, submandibular or tonsillar adenopathy. Cervical: No cervical adenopathy. Skin:     General: Skin is warm and dry. Neurological:      Mental Status: He is alert and oriented to person, place, and time. Psychiatric:         Behavior: Behavior normal. Behavior is cooperative. Thought Content: Thought content normal.         Judgment: Judgment normal.       On this date 12/10/2021 I have spent 35 minutes reviewing previous notes, test results and face to face with the patient discussing the diagnosis and importance of compliance with the treatment plan as well as documenting on the day of the visit. An electronic signature was used to authenticate this note.   -- Goran Reagan NP

## 2021-12-12 LAB
PSA SERPL-MCNC: 0.9 NG/ML (ref 0–4)
REFLEX CRITERIA: NORMAL

## 2021-12-14 NOTE — PROGRESS NOTES
Diabetes screen negative. Liver and kidney function normal.  Blood counts normal (not anemic). Cholesterol numbers look great! Prostate cancer screening negative.     Keep up the good work!!

## 2022-01-05 RX ORDER — ATORVASTATIN CALCIUM 20 MG/1
TABLET, FILM COATED ORAL
Qty: 90 TABLET | Refills: 3 | Status: SHIPPED | OUTPATIENT
Start: 2022-01-05

## 2022-03-01 DIAGNOSIS — I10 ESSENTIAL HYPERTENSION, BENIGN: ICD-10-CM

## 2022-03-01 RX ORDER — AMLODIPINE BESYLATE 5 MG/1
TABLET ORAL
Qty: 90 TABLET | Refills: 3 | Status: SHIPPED | OUTPATIENT
Start: 2022-03-01

## 2022-08-16 ENCOUNTER — TELEPHONE (OUTPATIENT)
Dept: FAMILY MEDICINE CLINIC | Age: 57
End: 2022-08-16

## 2022-08-17 NOTE — TELEPHONE ENCOUNTER
Patient would like to discuss lab results.    Please follow up to advise.   Verified patient with two type of identifiers. Spoke to pt - he has been having cold-like symptoms x 4 days. Tested negative for covid with at home test on Monday. PCP is currently out of the office, advised treating symptoms conservatively at home, test again on 5th day of symptoms, go to urgent care for evaluation is symptoms worsen. Pt verbalized understanding.

## 2022-08-23 ENCOUNTER — TELEPHONE (OUTPATIENT)
Dept: FAMILY MEDICINE CLINIC | Age: 57
End: 2022-08-23

## 2022-08-23 DIAGNOSIS — J32.9 OTHER SINUSITIS, UNSPECIFIED CHRONICITY: Primary | ICD-10-CM

## 2022-08-23 NOTE — TELEPHONE ENCOUNTER
Patient wants to get something called in for cough and congestion.   Please give him a call @ [phone]

## 2022-08-24 RX ORDER — AZITHROMYCIN 250 MG/1
TABLET, FILM COATED ORAL
Qty: 6 TABLET | Refills: 0 | Status: SHIPPED | OUTPATIENT
Start: 2022-08-24 | End: 2022-10-05 | Stop reason: ALTCHOICE

## 2022-08-24 NOTE — TELEPHONE ENCOUNTER
Orders Placed This Encounter    azithromycin (ZITHROMAX) 250 mg tablet     Sig: Take 2 tablets today, then take 1 tablet daily     Dispense:  6 Tablet     Refill:  0     For additional symptom relief, I suggest the following over-the-counter remedies: For fevers or pain: acetaminophen (Tylenol) or ibuprofen (Advil, Motrin) or naproxen (Aleve)  For dry cough: medications containing dextromethorphan, such as Delsym, Robitussin DM or Mucinex DM  For congestion or sinus pressure: decongestant nasal sprays, such as Afrin (for up to 3 days), medications containing guaifenesin to help break up mucus, such as Mucinex or Robitussin, nasal steroid sprays, such as Flonase, Sensimist, Rhinocort or Nasonex and saline nasal sprays, neti pot or sinus rinse bottle     Running a humidifier in your bedroom may be helpful for many of your symptoms. If your cough is keeping you awake at night, you can try raising your head with an extra pillow. If the skin around your nose and lips becomes sore, you can put some petroleum jelly on the area.     Please seek more urgent medical attention if you develop any of the following:  Chest pain  Shortness of breath  Fevers greater than 103  Severe headache   Dizziness  A new rash  Confusion  Extreme weakness

## 2022-08-24 NOTE — TELEPHONE ENCOUNTER
Verified patient with two type of identifiers. Spoke to pt - he is having dry cough and nasal congestion x 2 weeks. He has tried mucinex with no relief. Says mucus has been clear. Tested negative with at-home test on Monday.  Please advise

## 2022-10-05 ENCOUNTER — OFFICE VISIT (OUTPATIENT)
Dept: FAMILY MEDICINE CLINIC | Age: 57
End: 2022-10-05

## 2022-10-05 VITALS
BODY MASS INDEX: 25.71 KG/M2 | SYSTOLIC BLOOD PRESSURE: 136 MMHG | HEART RATE: 71 BPM | RESPIRATION RATE: 14 BRPM | WEIGHT: 163.8 LBS | TEMPERATURE: 97.9 F | OXYGEN SATURATION: 99 % | DIASTOLIC BLOOD PRESSURE: 70 MMHG | HEIGHT: 67 IN

## 2022-10-05 DIAGNOSIS — I10 ESSENTIAL HYPERTENSION, BENIGN: ICD-10-CM

## 2022-10-05 DIAGNOSIS — Z12.5 PROSTATE CANCER SCREENING: ICD-10-CM

## 2022-10-05 DIAGNOSIS — I73.9 PAD (PERIPHERAL ARTERY DISEASE) (HCC): ICD-10-CM

## 2022-10-05 DIAGNOSIS — Z72.0 TOBACCO ABUSE: ICD-10-CM

## 2022-10-05 DIAGNOSIS — Z23 ENCOUNTER FOR IMMUNIZATION: ICD-10-CM

## 2022-10-05 DIAGNOSIS — M25.551 RIGHT HIP PAIN: Primary | ICD-10-CM

## 2022-10-05 RX ORDER — CILOSTAZOL 100 MG/1
TABLET ORAL
COMMUNITY
Start: 2022-09-24

## 2022-10-05 RX ORDER — PREDNISONE 10 MG/1
10 TABLET ORAL 2 TIMES DAILY
Qty: 10 TABLET | Refills: 0 | Status: SHIPPED | OUTPATIENT
Start: 2022-10-05 | End: 2022-10-21 | Stop reason: SDUPTHER

## 2022-10-05 NOTE — PROGRESS NOTES
Chief Complaint   Patient presents with    Hip Pain     x 3-4 weeks       1. \"Have you been to the ER, urgent care clinic since your last visit? Hospitalized since your last visit? \" No    2. \"Have you seen or consulted any other health care providers outside of the [address] [place] [address] since your last visit? \" No     3. For patients aged 39-70: Has the patient had a colonoscopy / FIT/ Cologuard? Yes - Care Gap present. Most recent result on file      If the patient is female:    4. For patients aged 41-77: Has the patient had a mammogram within the past 2 years? NA - based on age or sex      11. For patients aged 21-65: Has the patient had a pap smear? NA - based on age or sex    Pt is here today due to R hip pain x 3-4 weeks. Describes as burning pain that occurs when twisting or bending. He has no taken any pain medications. Resting helps.      Health Maintenance Due   Topic Date Due    Pneumococcal 0-64 years (1 - PCV) Never done    Shingrix Vaccine Age 50> (1 of 2) Never done    COVID-19 Vaccine (4 - Booster for Pfizer series) 04/10/2022    Flu Vaccine (1) 08/01/2022

## 2022-10-05 NOTE — PROGRESS NOTES
Luz Maria Simmons (: 1965) is a 62 y.o. male, established patient, here for evaluation of the following chief complaint(s):  Hip Pain (x 3-4 weeks)       ASSESSMENT/PLAN:  Below is the assessment and plan developed based on review of pertinent history, physical exam, labs, studies, and medications. 1. Right hip pain - patient to return next week for xray of hip. Will trial steroid taper, given HEP. If no improvement in 2-3 week, will need referral to ortho. -     predniSONE (DELTASONE) 10 mg tablet; Take 10 mg by mouth two (2) times a day for 5 days. , Normal, Disp-10 Tablet, R-0  2. Essential hypertension, benign - stable  -     CBC W/O DIFF; Future  -     METABOLIC PANEL, COMPREHENSIVE; Future  -     LIPID PANEL; Future  -     URINALYSIS W/MICROSCOPIC; Future  3. PAD (peripheral artery disease) ([facility] [address] - followed by vascular  4. Prostate cancer screening  -     PSA W/ REFLX FREE PSA; Future  5. Tobacco abuse - patient wants to try nicotine patches again  6. Encounter for immunization  -     INFLUENZA, FLUARIX, FLULAVAL, FLUZONE (AGE 6 MO+), AFLURIA(AGE 3Y+) IM, PF, 0.5 ML    Return in about 6 months (around 2023). SUBJECTIVE/OBJECTIVE:  HPIPt is here today due to R hip pain x 3-4 weeks. Describes as burning pain that occurs when twisting or bending. He has no taken any pain medications. Resting helps. has trouble bending down to tie shoe. Has gotten better. Having small amount of pain. Not taken any OTC meds. No numbness or tingling in legs  Started with right rib pain. Not trying to lose weight. Has been walking a lot at work, going up and down stairs. Appetite fair. Kyle Snyder does not eat much food. Feels lowe faster and on small amounts of food. patient taking Pletal once daily (not twice daily as ordered) and ASA 81mg for PAD. Has follow up in 2022 - no change - Dr. Carlos Roy)    Trying to eat healthier. Not eating a lot of fried foods.   Has cut back - does not smoke at work. Has 4 -5cigs daily. dx with PAD, had stent placed in leg. States he has ability to walk without claudication pain. Colonoscopy - [facility],      Taking amlodipine for HTN. Compliant w/ meds, low salt diet, and exercise. No swelling, headache or dizziness. No chest pain, SOB, palpitations. Otherwise feeling well since the last visit  No Known Allergies    Past Medical History:   Diagnosis Date    Esophagitis     HTN (hypertension) 2/2016    IGT (impaired glucose tolerance)        Past Surgical History:   Procedure Laterality Date    HX COLONOSCOPY  8/2015    polyps       Social History     Socioeconomic History    Marital status: LEGALLY      Spouse name: Not on file    Number of children: Not on file    Years of education: Not on file    Highest education level: Not on file   Occupational History    Not on file   Tobacco Use    Smoking status: Every Day     Packs/day: 0.25     Types: Cigarettes    Smokeless tobacco: Never   Vaping Use    Vaping Use: Never used   Substance and Sexual Activity    Alcohol use: Yes     Comment: socially    Drug use: Not Currently    Sexual activity: Not Currently   Other Topics Concern    Not on file   Social History Narrative    2 biological (25yo son and 30y daughter)  and 1 stepson, age 28y . Works for [employer] as , part-time at [employer].   Has 4 grandchildren     Social Determinants of Health     Financial Resource Strain: Not on file   Food Insecurity: Not on file   Transportation Needs: Not on file   Physical Activity: Not on file   Stress: Not on file   Social Connections: Not on file   Intimate Partner Violence: Not on file   Housing Stability: Not on file       Family History   Problem Relation Age of Onset    OSTEOARTHRITIS Father     Cataract Father     Other Mother 58    Stroke Mother     Diabetes Mother     Hypertension Mother     Hypertension Sister     Diabetes Brother     Kidney Disease Brother on HD,  in late 62s    Heart Disease Brother         CABG,  in late 62s    Seizures Brother     Hypertension Sister     Hypertension Sister     Hypertension Sister        Current Outpatient Medications   Medication Sig    cilostazoL (PLETAL) 100 mg tablet 1 TABLET 30 MINUTES BEFORE OR 2 HOURS AFTER BREAKFAST AND DINNER ORALLY TWICE A DAY 30 DAY(S)    predniSONE (DELTASONE) 10 mg tablet Take 10 mg by mouth two (2) times a day for 5 days. amLODIPine (NORVASC) 5 mg tablet TAKE 1 TABLET BY MOUTH EVERY DAY    atorvastatin (LIPITOR) 20 mg tablet TAKE 1 TABLET BY MOUTH EVERY DAY    nicotine (NICODERM CQ) 14 mg/24 hr patch APPLY 1 PATCH TRANSDERMALLY EVERY 24 HOURS    sildenafil citrate (VIAGRA) 100 mg tablet TAKE ONE TABLET BY MOUTH DAILY AS NEEDED ( TAKE 30 TO 60 MINUTES PRIOR TO SEX ON EMPTY STOMACH)    aspirin delayed-release 81 mg tablet Take 1 Tab by mouth daily. No current facility-administered medications for this visit. Review of Systems   Constitutional:  Positive for unexpected weight change (unexplained weight loss). Negative for appetite change, chills, fatigue and fever. Respiratory:  Negative for cough and shortness of breath. Cardiovascular:  Negative for chest pain, palpitations and leg swelling. Gastrointestinal:  Negative for abdominal pain, constipation, diarrhea, nausea and vomiting. Early satiety   Endocrine: Negative for polydipsia, polyphagia and polyuria. Genitourinary:  Negative for dysuria, frequency and urgency. Musculoskeletal:  Positive for arthralgias (right hip pain). Neurological:  Negative for dizziness, light-headedness, numbness and headaches. Psychiatric/Behavioral:  Negative for dysphoric mood and sleep disturbance. The patient is not nervous/anxious.     Vitals:    10/05/22 1259 10/05/22 1330   BP: (!) 142/72 136/70   Pulse: 71    Resp: 14    Temp: 97.9 °F (36.6 °C)    TempSrc: Oral    SpO2: 99%    Weight: 163 lb 12.8 oz (74.3 kg)    Height: 5' 7\" (1.702 m)      Physical Exam  Constitutional:       Appearance: Normal appearance. He is well-developed. HENT:      Right Ear: Hearing normal.      Left Ear: Hearing normal.   Neck:      Thyroid: No thyromegaly. Cardiovascular:      Rate and Rhythm: Normal rate and regular rhythm. Heart sounds: Normal heart sounds. Pulmonary:      Effort: Pulmonary effort is normal.      Breath sounds: Normal breath sounds. Abdominal:      General: Bowel sounds are normal.      Palpations: Abdomen is soft. Tenderness: There is no abdominal tenderness. Hernia: No hernia is present. There is no hernia in the right femoral area or right inguinal area. Musculoskeletal:      Right lower leg: No edema. Left lower leg: No edema. Lymphadenopathy:      Head:      Right side of head: No submental, submandibular or tonsillar adenopathy. Left side of head: No submental, submandibular or tonsillar adenopathy. Cervical: No cervical adenopathy. Skin:     General: Skin is warm and dry. Neurological:      Mental Status: He is alert and oriented to person, place, and time. Psychiatric:         Attention and Perception: Attention normal.         Mood and Affect: Mood and affect normal.         Speech: Speech normal.         Behavior: Behavior normal. Behavior is cooperative. Thought Content: Thought content normal.         Cognition and Memory: Cognition and memory normal.         Judgment: Judgment normal.         On this date 10/05/2022 I have spent 32 minutes reviewing previous notes, test results and face to face with the patient discussing the diagnosis and importance of compliance with the treatment plan as well as documenting on the day of the visit. An electronic signature was used to authenticate this note.   -- Rachid Walton NP

## 2022-10-06 LAB
ALBUMIN SERPL-MCNC: 4.1 G/DL (ref 3.5–5)
ALBUMIN/GLOB SERPL: 1.2 {RATIO} (ref 1.1–2.2)
ALP SERPL-CCNC: 106 U/L (ref 45–117)
ALT SERPL-CCNC: 26 U/L (ref 12–78)
ANION GAP SERPL CALC-SCNC: 3 MMOL/L (ref 5–15)
APPEARANCE UR: CLEAR
AST SERPL-CCNC: 14 U/L (ref 15–37)
BACTERIA URNS QL MICRO: NEGATIVE /HPF
BILIRUB SERPL-MCNC: 0.4 MG/DL (ref 0.2–1)
BILIRUB UR QL: NEGATIVE
BUN SERPL-MCNC: 15 MG/DL (ref 6–20)
BUN/CREAT SERPL: 14 (ref 12–20)
CALCIUM SERPL-MCNC: 10 MG/DL (ref 8.5–10.1)
CHLORIDE SERPL-SCNC: 108 MMOL/L (ref 97–108)
CHOLEST SERPL-MCNC: 130 MG/DL
CO2 SERPL-SCNC: 30 MMOL/L (ref 21–32)
COLOR UR: NORMAL
CREAT SERPL-MCNC: 1.09 MG/DL (ref 0.7–1.3)
EPITH CASTS URNS QL MICRO: NORMAL /LPF
ERYTHROCYTE [DISTWIDTH] IN BLOOD BY AUTOMATED COUNT: 14.1 % (ref 11.5–14.5)
GLOBULIN SER CALC-MCNC: 3.4 G/DL (ref 2–4)
GLUCOSE SERPL-MCNC: 108 MG/DL (ref 65–100)
GLUCOSE UR STRIP.AUTO-MCNC: NEGATIVE MG/DL
HCT VFR BLD AUTO: 44.4 % (ref 36.6–50.3)
HDLC SERPL-MCNC: 61 MG/DL
HDLC SERPL: 2.1 {RATIO} (ref 0–5)
HGB BLD-MCNC: 15.5 G/DL (ref 12.1–17)
HGB UR QL STRIP: NEGATIVE
HYALINE CASTS URNS QL MICRO: NORMAL /LPF (ref 0–5)
KETONES UR QL STRIP.AUTO: NEGATIVE MG/DL
LDLC SERPL CALC-MCNC: 49.8 MG/DL (ref 0–100)
LEUKOCYTE ESTERASE UR QL STRIP.AUTO: NEGATIVE
MCH RBC QN AUTO: 31.5 PG (ref 26–34)
MCHC RBC AUTO-ENTMCNC: 34.9 G/DL (ref 30–36.5)
MCV RBC AUTO: 90.2 FL (ref 80–99)
NITRITE UR QL STRIP.AUTO: NEGATIVE
NRBC # BLD: 0 K/UL (ref 0–0.01)
NRBC BLD-RTO: 0 PER 100 WBC
PH UR STRIP: 5.5 [PH] (ref 5–8)
PLATELET # BLD AUTO: 229 K/UL (ref 150–400)
PMV BLD AUTO: 10.3 FL (ref 8.9–12.9)
POTASSIUM SERPL-SCNC: 4.2 MMOL/L (ref 3.5–5.1)
PROT SERPL-MCNC: 7.5 G/DL (ref 6.4–8.2)
PROT UR STRIP-MCNC: NEGATIVE MG/DL
RBC # BLD AUTO: 4.92 M/UL (ref 4.1–5.7)
RBC #/AREA URNS HPF: NORMAL /HPF (ref 0–5)
SODIUM SERPL-SCNC: 141 MMOL/L (ref 136–145)
SP GR UR REFRACTOMETRY: 1.01 (ref 1–1.03)
TRIGL SERPL-MCNC: 96 MG/DL (ref ?–150)
UROBILINOGEN UR QL STRIP.AUTO: 0.2 EU/DL (ref 0.2–1)
VLDLC SERPL CALC-MCNC: 19.2 MG/DL
WBC # BLD AUTO: 6.5 K/UL (ref 4.1–11.1)
WBC URNS QL MICRO: NORMAL /HPF (ref 0–4)

## 2022-10-07 LAB
PSA SERPL-MCNC: 1.5 NG/ML (ref 0–4)
REFLEX CRITERIA: NORMAL

## 2022-10-10 NOTE — PROGRESS NOTES
Liver and kidney function normal.  Blood counts normal (not anemic). Cholesterol numbers look great!   Prostate cancer screening negative

## 2022-10-12 ENCOUNTER — OFFICE VISIT (OUTPATIENT)
Dept: FAMILY MEDICINE CLINIC | Age: 57
End: 2022-10-12

## 2022-10-12 DIAGNOSIS — M25.551 RIGHT HIP PAIN: Primary | ICD-10-CM

## 2022-10-13 NOTE — PROGRESS NOTES
Tone Mccormick (: 1965) is a 62 y.o. male, established patient, here for evaluation of the following chief complaint(s):  Radiology Only       ASSESSMENT/PLAN:  Below is the assessment and plan developed based on review of pertinent history, physical exam, labs, studies, and medications. 1. Right hip pain  -     XR HIP RT W OR WO PELV 2-3 VWS; Future    An electronic signature was used to authenticate this note.   -- Cristo Butler NP

## 2022-10-18 DIAGNOSIS — N52.9 ERECTILE DYSFUNCTION, UNSPECIFIED ERECTILE DYSFUNCTION TYPE: ICD-10-CM

## 2022-10-18 RX ORDER — SILDENAFIL 100 MG/1
TABLET, FILM COATED ORAL
Qty: 30 TABLET | Refills: 4 | Status: SHIPPED | OUTPATIENT
Start: 2022-10-18

## 2022-10-18 NOTE — TELEPHONE ENCOUNTER
----- Message from Dona King sent at 10/18/2022  9:18 AM EDT -----  Subject: Refill Request    QUESTIONS  Name of Medication? Other - sildenafil citrate (VIAGRA) 100 mg tablet  Patient-reported dosage and instructions? TAKE ONE TABLET BY MOUTH DAILY   AS NEEDED ( TAKE 30 TO 60 MINUTES PRIOR TO SEX ON EMPTY STOMACH  How many days do you have left? 0  Preferred Pharmacy? [facility] 21 [phone]  Pharmacy phone number (if available)? [phone]  ---------------------------------------------------------------------------  --------------  CALL BACK INFO  What is the best way for the office to contact you? OK to leave message on   voicemail  Preferred Call Back Phone Number? [phone]  ---------------------------------------------------------------------------  --------------  SCRIPT ANSWERS  Relationship to Patient?  Self

## 2022-10-20 DIAGNOSIS — M25.551 RIGHT HIP PAIN: ICD-10-CM

## 2022-10-20 NOTE — TELEPHONE ENCOUNTER
Patient called wanting to know the results of the x-ray that was taken of his hip. He requested a call back and can be reached at [phone].

## 2022-10-21 NOTE — TELEPHONE ENCOUNTER
Verified patient with two type of identifiers. Spoke to pt - reviewed x-ray results per Mckenna De Jesus NP. He is still experiencing discomfort, requesting referral to orthopedics and refill of Prednisone.

## 2022-10-24 RX ORDER — PREDNISONE 10 MG/1
10 TABLET ORAL 2 TIMES DAILY
Qty: 10 TABLET | Refills: 0 | Status: SHIPPED | OUTPATIENT
Start: 2022-10-24 | End: 2022-10-29

## 2022-10-25 ENCOUNTER — TELEPHONE (OUTPATIENT)
Dept: ORTHOPEDIC SURGERY | Age: 57
End: 2022-10-25

## 2022-10-25 NOTE — TELEPHONE ENCOUNTER
Attempted to call the patient to follow up on a referral we received from his PCP for his Rt Hip pain however it went to  and the mailbox was full.

## 2022-10-25 NOTE — TELEPHONE ENCOUNTER
Orders Placed This Encounter    REFERRAL TO ORTHOPEDICS     Referral Priority:   Routine     Referral Type:   Consultation     Referral Reason:   Specialty Services Required     Referred to Provider:   Michael Denise DO     Requested Specialty:   Orthopedic Surgery     Number of Visits Requested:   1    predniSONE (DELTASONE) 10 mg tablet     Sig: Take 10 mg by mouth two (2) times a day for 5 days.      Dispense:  10 Tablet     Refill:  0     r

## 2022-11-15 ENCOUNTER — TELEPHONE (OUTPATIENT)
Dept: FAMILY MEDICINE CLINIC | Age: 57
End: 2022-11-15

## 2022-11-15 NOTE — TELEPHONE ENCOUNTER
Verified patient with two type of identifiers. Spoke to pt - he is having R hip pain x 2 weeks. Describes as sharp pain that worsens with especially climbing stairs. Asking what he should take for this. He has not tried any otc medications.  Please advise

## 2022-11-15 NOTE — TELEPHONE ENCOUNTER
Patient states that he continues to have pain in his hip and would like to know if something can be prescribed for the pain.  Please call [phone]

## 2022-11-15 NOTE — TELEPHONE ENCOUNTER
He can try OTC NSAIDs (like Aleve BID, Advil every 6h) or Tylenol ES 500mg every 6 hours as needed for pain. ,  Per my note from his xray - Degenerative changes (arthritis) in right hip.   If no improvement with medication, can refer to orthopedic specialist.  [facility]

## 2022-11-22 RX ORDER — ATORVASTATIN CALCIUM 20 MG/1
TABLET, FILM COATED ORAL
Qty: 90 TABLET | Refills: 3 | Status: SHIPPED | OUTPATIENT
Start: 2022-11-22

## 2022-11-30 DIAGNOSIS — I10 ESSENTIAL HYPERTENSION, BENIGN: ICD-10-CM

## 2022-11-30 RX ORDER — AMLODIPINE BESYLATE 5 MG/1
TABLET ORAL
Qty: 90 TABLET | Refills: 3 | Status: SHIPPED | OUTPATIENT
Start: 2022-11-30

## 2023-10-06 ENCOUNTER — NURSE TRIAGE (OUTPATIENT)
Dept: OTHER | Facility: CLINIC | Age: 58
End: 2023-10-06

## 2023-10-06 NOTE — TELEPHONE ENCOUNTER
[place]    Received call from [address] at [facility] with [employer]. Subjective: Caller states \"R hip pain with numbness and tingling and sometimes cannot walk on it, symptoms have been ongoing for about a year and half. \"     Current Symptoms: R hip pain with numbness, R leg weakness, cannot walk for long periods of times. Onset: 1 year ago; intermittent    Associated Symptoms: reduced activity    Pain Severity: 4/10; aching; intermittent    Temperature: Denies     What has been tried: OTC meds    Recommended disposition: See in Office Within 2 Weeks    Care advice provided, patient verbalizes understanding; denies any other questions or concerns; instructed to call back for any new or worsening symptoms. Patient/Caller agrees with recommended disposition; writer provided warm transfer to [facility] at [facility] for appointment scheduling    Attention Provider: Thank you for allowing me to participate in the care of your patient. The patient was connected to triage in response to information provided to the E[facility]/PSC. Please do not respond through this encounter as the response is not directed to a shared pool.     Reason for Disposition   Hip pain is a chronic symptom (recurrent or ongoing AND present > 4 weeks)    Protocols used: Hip Pain-ADULT-

## 2023-10-26 ENCOUNTER — OFFICE VISIT (OUTPATIENT)
Age: 58
End: 2023-10-26
Payer: COMMERCIAL

## 2023-10-26 VITALS
RESPIRATION RATE: 20 BRPM | WEIGHT: 170.6 LBS | BODY MASS INDEX: 26.78 KG/M2 | DIASTOLIC BLOOD PRESSURE: 75 MMHG | TEMPERATURE: 98.7 F | OXYGEN SATURATION: 97 % | HEIGHT: 67 IN | SYSTOLIC BLOOD PRESSURE: 133 MMHG | HEART RATE: 61 BPM

## 2023-10-26 DIAGNOSIS — Z23 ENCOUNTER FOR IMMUNIZATION: ICD-10-CM

## 2023-10-26 DIAGNOSIS — I10 ESSENTIAL (PRIMARY) HYPERTENSION: Primary | ICD-10-CM

## 2023-10-26 DIAGNOSIS — Z12.5 PROSTATE CANCER SCREENING: ICD-10-CM

## 2023-10-26 DIAGNOSIS — M16.11 PRIMARY OSTEOARTHRITIS OF RIGHT HIP: ICD-10-CM

## 2023-10-26 DIAGNOSIS — I73.9 PAD (PERIPHERAL ARTERY DISEASE) (HCC): ICD-10-CM

## 2023-10-26 DIAGNOSIS — Z72.0 TOBACCO USE: ICD-10-CM

## 2023-10-26 PROCEDURE — G8427 DOCREV CUR MEDS BY ELIG CLIN: HCPCS | Performed by: NURSE PRACTITIONER

## 2023-10-26 PROCEDURE — 3075F SYST BP GE 130 - 139MM HG: CPT | Performed by: NURSE PRACTITIONER

## 2023-10-26 PROCEDURE — G8482 FLU IMMUNIZE ORDER/ADMIN: HCPCS | Performed by: NURSE PRACTITIONER

## 2023-10-26 PROCEDURE — 90674 CCIIV4 VAC NO PRSV 0.5 ML IM: CPT | Performed by: NURSE PRACTITIONER

## 2023-10-26 PROCEDURE — 3078F DIAST BP <80 MM HG: CPT | Performed by: NURSE PRACTITIONER

## 2023-10-26 PROCEDURE — G8419 CALC BMI OUT NRM PARAM NOF/U: HCPCS | Performed by: NURSE PRACTITIONER

## 2023-10-26 PROCEDURE — 99214 OFFICE O/P EST MOD 30 MIN: CPT | Performed by: NURSE PRACTITIONER

## 2023-10-26 PROCEDURE — 3017F COLORECTAL CA SCREEN DOC REV: CPT | Performed by: NURSE PRACTITIONER

## 2023-10-26 PROCEDURE — 4004F PT TOBACCO SCREEN RCVD TLK: CPT | Performed by: NURSE PRACTITIONER

## 2023-10-26 PROCEDURE — 90471 IMMUNIZATION ADMIN: CPT | Performed by: NURSE PRACTITIONER

## 2023-10-26 RX ORDER — LIDOCAINE 50 MG/G
1 PATCH TOPICAL DAILY
Qty: 30 PATCH | Refills: 5 | Status: SHIPPED | OUTPATIENT
Start: 2023-10-26 | End: 2024-04-23

## 2023-10-26 RX ORDER — NICOTINE 21 MG/24HR
PATCH, TRANSDERMAL 24 HOURS TRANSDERMAL
Qty: 30 PATCH | Refills: 3 | Status: SHIPPED | OUTPATIENT
Start: 2023-10-26

## 2023-10-26 SDOH — ECONOMIC STABILITY: FOOD INSECURITY: WITHIN THE PAST 12 MONTHS, THE FOOD YOU BOUGHT JUST DIDN'T LAST AND YOU DIDN'T HAVE MONEY TO GET MORE.: NEVER TRUE

## 2023-10-26 SDOH — ECONOMIC STABILITY: FOOD INSECURITY: WITHIN THE PAST 12 MONTHS, YOU WORRIED THAT YOUR FOOD WOULD RUN OUT BEFORE YOU GOT MONEY TO BUY MORE.: NEVER TRUE

## 2023-10-26 SDOH — ECONOMIC STABILITY: HOUSING INSECURITY
IN THE LAST 12 MONTHS, WAS THERE A TIME WHEN YOU DID NOT HAVE A STEADY PLACE TO SLEEP OR SLEPT IN A SHELTER (INCLUDING NOW)?: NO

## 2023-10-26 SDOH — ECONOMIC STABILITY: INCOME INSECURITY: HOW HARD IS IT FOR YOU TO PAY FOR THE VERY BASICS LIKE FOOD, HOUSING, MEDICAL CARE, AND HEATING?: NOT HARD AT ALL

## 2023-10-26 ASSESSMENT — ENCOUNTER SYMPTOMS
VOMITING: 0
SHORTNESS OF BREATH: 0
BLOOD IN STOOL: 0
CONSTIPATION: 0
ABDOMINAL PAIN: 0
DIARRHEA: 0
COUGH: 0
NAUSEA: 0

## 2023-10-26 ASSESSMENT — PATIENT HEALTH QUESTIONNAIRE - PHQ9
SUM OF ALL RESPONSES TO PHQ QUESTIONS 1-9: 0
2. FEELING DOWN, DEPRESSED OR HOPELESS: 0
SUM OF ALL RESPONSES TO PHQ QUESTIONS 1-9: 0
SUM OF ALL RESPONSES TO PHQ9 QUESTIONS 1 & 2: 0
SUM OF ALL RESPONSES TO PHQ QUESTIONS 1-9: 0
1. LITTLE INTEREST OR PLEASURE IN DOING THINGS: 0
SUM OF ALL RESPONSES TO PHQ QUESTIONS 1-9: 0

## 2023-10-26 NOTE — PROGRESS NOTES
Chief Complaint   Patient presents with    Follow-up    Hip Pain       1. \"Have you been to the ER, urgent care clinic since your last visit? Hospitalized since your last visit? \" No    2. \"Have you seen or consulted any other health care providers outside of the [address] [place] [address] since your last visit? \" Yes- Vascular      3. For patients aged 43-73: Has the patient had a colonoscopy / FIT/ Cologuard?  Yes        Health Maintenance Due   Topic Date Due    Hepatitis B vaccine (1 of 3 - 3-dose series) Never done    Pneumococcal 0-64 years Vaccine (1 - PCV) Never done    Shingles vaccine (1 of 2) Never done    Flu vaccine (1) 08/01/2023    Lipids  10/05/2023    Depression Screen  10/05/2023

## 2023-10-26 NOTE — PROGRESS NOTES
Daria Polanco (:  1965) is a 62 y.o. male,Established patient, here for evaluation of the following chief complaint(s):  Follow-up and Hip Pain         ASSESSMENT/PLAN:  1. Essential (primary) hypertension - stable. Continue amlodipine 5mg. Check labs  -     CBC; Future  -     Comprehensive Metabolic Panel; Future  -     Lipid Panel; Future  -     Urinalysis; Future  2. Primary osteoarthritis of right hip - given rx for lidocaine patches. Referred to outpatient PT and ortho. -     lidocaine (LIDODERM) 5 %; Place 1 patch onto the skin daily 12 hours on, 12 hours off., Disp-30 patch, R-5Normal  -     External Referral To Physical Therapy  -     Luke Holman MD, Orthopedic Surgery (hip), [place]  3. PAD (peripheral artery disease) ([address]) - followed by vascular. Taking Pletal, atorvastatin 20mg, ASA 81mg  4. Prostate cancer screening  -     PSA Screening; Future  5. Tobacco use  -     nicotine (NICODERM CQ) 14 MG/24HR; APPLY 1 PATCH TRANSDERMALLY EVERY 24 HOURS, Disp-30 patch, R-3Normal  6. Encounter for immunization  -     Influenza, FLUCELVAX, (age 10 mo+), IM, Preservative Free, 0.5 mL      Return in about 6 months (around 2024). Subjective   SUBJECTIVE/OBJECTIVE:  HPI  patient comes in today for hip pain  R hip pain, has pain in hip daily, when he steps up on stairs, walking. with numbness and tingling and sometimes cannot walk on it, symptoms have been ongoing for about a year and half  Has been using lidocaine patches with some relief. Has not tried tylenol or Advil. Had prednisone in Oct 2022 with minimal relief. Did not do the HEP provided and did not schedule with ortho. Does a lot of walking at work     Taking amlodipine for HTN. Compliant w/ meds, low salt diet, and exercise. No swelling, headache or dizziness. No chest pain, SOB, palpitations. Trying to eat healthier. Not eating a lot of fried foods. dx with PAD, had stent placed in leg.   States

## 2023-10-31 ENCOUNTER — OFFICE VISIT (OUTPATIENT)
Age: 58
End: 2023-10-31
Payer: COMMERCIAL

## 2023-10-31 DIAGNOSIS — Z12.5 PROSTATE CANCER SCREENING: ICD-10-CM

## 2023-10-31 DIAGNOSIS — Z23 NEED FOR PROPHYLACTIC VACCINATION AGAINST STREPTOCOCCUS PNEUMONIAE (PNEUMOCOCCUS): Primary | ICD-10-CM

## 2023-10-31 DIAGNOSIS — I10 ESSENTIAL (PRIMARY) HYPERTENSION: ICD-10-CM

## 2023-10-31 DIAGNOSIS — Z23 NEED FOR PROPHYLACTIC VACCINATION AND INOCULATION AGAINST VARICELLA: ICD-10-CM

## 2023-10-31 LAB
ALBUMIN SERPL-MCNC: 3.8 G/DL (ref 3.5–5)
ALBUMIN/GLOB SERPL: 1.4 (ref 1.1–2.2)
ALP SERPL-CCNC: 97 U/L (ref 45–117)
ALT SERPL-CCNC: 26 U/L (ref 12–78)
ANION GAP SERPL CALC-SCNC: 4 MMOL/L (ref 5–15)
APPEARANCE UR: CLEAR
AST SERPL-CCNC: 14 U/L (ref 15–37)
BILIRUB SERPL-MCNC: 0.3 MG/DL (ref 0.2–1)
BILIRUB UR QL: NEGATIVE
BUN SERPL-MCNC: 13 MG/DL (ref 6–20)
BUN/CREAT SERPL: 12 (ref 12–20)
CALCIUM SERPL-MCNC: 8.9 MG/DL (ref 8.5–10.1)
CHLORIDE SERPL-SCNC: 112 MMOL/L (ref 97–108)
CHOLEST SERPL-MCNC: 130 MG/DL
CO2 SERPL-SCNC: 27 MMOL/L (ref 21–32)
COLOR UR: NORMAL
CREAT SERPL-MCNC: 1.09 MG/DL (ref 0.7–1.3)
ERYTHROCYTE [DISTWIDTH] IN BLOOD BY AUTOMATED COUNT: 13.8 % (ref 11.5–14.5)
GLOBULIN SER CALC-MCNC: 2.8 G/DL (ref 2–4)
GLUCOSE SERPL-MCNC: 90 MG/DL (ref 65–100)
GLUCOSE UR STRIP.AUTO-MCNC: NEGATIVE MG/DL
HCT VFR BLD AUTO: 43.2 % (ref 36.6–50.3)
HDLC SERPL-MCNC: 54 MG/DL
HDLC SERPL: 2.4 (ref 0–5)
HGB BLD-MCNC: 14.6 G/DL (ref 12.1–17)
HGB UR QL STRIP: NEGATIVE
KETONES UR QL STRIP.AUTO: NEGATIVE MG/DL
LDLC SERPL CALC-MCNC: 66.2 MG/DL (ref 0–100)
LEUKOCYTE ESTERASE UR QL STRIP.AUTO: NEGATIVE
MCH RBC QN AUTO: 29.6 PG (ref 26–34)
MCHC RBC AUTO-ENTMCNC: 33.8 G/DL (ref 30–36.5)
MCV RBC AUTO: 87.4 FL (ref 80–99)
NITRITE UR QL STRIP.AUTO: NEGATIVE
NRBC # BLD: 0 K/UL (ref 0–0.01)
NRBC BLD-RTO: 0 PER 100 WBC
PH UR STRIP: 5.5 (ref 5–8)
PLATELET # BLD AUTO: 218 K/UL (ref 150–400)
PMV BLD AUTO: 10.1 FL (ref 8.9–12.9)
POTASSIUM SERPL-SCNC: 4.1 MMOL/L (ref 3.5–5.1)
PROT SERPL-MCNC: 6.6 G/DL (ref 6.4–8.2)
PROT UR STRIP-MCNC: NEGATIVE MG/DL
PSA SERPL-MCNC: 1 NG/ML (ref 0.01–4)
RBC # BLD AUTO: 4.94 M/UL (ref 4.1–5.7)
SODIUM SERPL-SCNC: 143 MMOL/L (ref 136–145)
SP GR UR REFRACTOMETRY: 1.01 (ref 1–1.03)
TRIGL SERPL-MCNC: 49 MG/DL
UROBILINOGEN UR QL STRIP.AUTO: 0.2 EU/DL (ref 0.2–1)
VLDLC SERPL CALC-MCNC: 9.8 MG/DL
WBC # BLD AUTO: 7.3 K/UL (ref 4.1–11.1)

## 2023-10-31 PROCEDURE — 90471 IMMUNIZATION ADMIN: CPT | Performed by: NURSE PRACTITIONER

## 2023-10-31 PROCEDURE — 90472 IMMUNIZATION ADMIN EACH ADD: CPT | Performed by: NURSE PRACTITIONER

## 2023-10-31 PROCEDURE — 90750 HZV VACC RECOMBINANT IM: CPT | Performed by: NURSE PRACTITIONER

## 2023-10-31 PROCEDURE — 90677 PCV20 VACCINE IM: CPT | Performed by: NURSE PRACTITIONER

## 2023-10-31 NOTE — PROGRESS NOTES
Name and Date of Birth Verified      Chief Complaint   Patient presents with    Labs Only     Orders 10/26/2023    Immunizations     Shingles and pneumonia vaccine     Orders Placed This Encounter    Pneumococcal, PCV20, PREVNAR 21, (age 25 yrs+), IM, PF    In- - [facility])

## 2023-11-01 ENCOUNTER — CLINICAL DOCUMENTATION (OUTPATIENT)
Age: 58
End: 2023-11-01

## 2023-11-02 ENCOUNTER — OFFICE VISIT (OUTPATIENT)
Age: 58
End: 2023-11-02

## 2023-11-02 DIAGNOSIS — M25.559 GREATER TROCHANTERIC PAIN SYNDROME: ICD-10-CM

## 2023-11-02 DIAGNOSIS — M25.551 PAIN OF RIGHT HIP: Primary | ICD-10-CM

## 2023-11-02 ASSESSMENT — PATIENT HEALTH QUESTIONNAIRE - PHQ9
1. LITTLE INTEREST OR PLEASURE IN DOING THINGS: 0
SUM OF ALL RESPONSES TO PHQ QUESTIONS 1-9: 0
SUM OF ALL RESPONSES TO PHQ QUESTIONS 1-9: 0
SUM OF ALL RESPONSES TO PHQ9 QUESTIONS 1 & 2: 0
SUM OF ALL RESPONSES TO PHQ QUESTIONS 1-9: 0
2. FEELING DOWN, DEPRESSED OR HOPELESS: 0
SUM OF ALL RESPONSES TO PHQ QUESTIONS 1-9: 0

## 2023-11-02 NOTE — PROGRESS NOTES
11/2/2023        Assessment: Peritrochanteric pain syndrome, right hip    Plan: This patient and I did discuss at length the anatomy, which I drew out in office. We discussed the potential causes of the issue, as well as the treatment options, which are largely nonoperative. We discussed that a mixture of anti-inflammatory analgesics, cortisone injections, as well as consistent physical therapy for 4-6 weeks is the standard of care of this issue. Evidence indicates that over 90% of patients can begin to resolve their issues in that time. The patient will proceed with PT and topical lidocaine patches (prescribed by PCP). The patient will then follow up in 4-6 weeks for a clinical check. Chief Complaint: Right hip pain    HPI: He  complains of right hip pain which is activity dependent. The patient has had activity dependent pain for a few years. The pain is located in the lateral aspect of the hip, it radiates somewhat distally and laterally, it is moderate in intensity. The patient complains of difficulty sleeping on the right side, limitation in the normal activities of daily living. The patient has tried lidocaine patches. No other surgery or pertinent history to this hip. Past Medical History:   Diagnosis Date    Esophagitis     HTN (hypertension) 2/2016    IGT (impaired glucose tolerance)     PAD (peripheral artery disease) ([address]) 10/26/2023       Past Surgical History:   Procedure Laterality Date    COLONOSCOPY  8/2015    polyps       Current Outpatient Medications on File Prior to Visit   Medication Sig Dispense Refill    nicotine (NICODERM CQ) 14 MG/24HR APPLY 1 PATCH TRANSDERMALLY EVERY 24 HOURS 30 patch 3    lidocaine (LIDODERM) 5 % Place 1 patch onto the skin daily 12 hours on, 12 hours off.  30 patch 5    amLODIPine (NORVASC) 5 MG tablet Take 1 tablet by mouth daily      aspirin 81 MG EC tablet Take 1 tablet by mouth daily      atorvastatin (LIPITOR) 20 MG tablet Take 1 tablet by mouth

## 2023-11-20 RX ORDER — SILDENAFIL 100 MG/1
TABLET, FILM COATED ORAL
Qty: 30 TABLET | Refills: 4 | Status: SHIPPED | OUTPATIENT
Start: 2023-11-20

## 2023-11-20 NOTE — TELEPHONE ENCOUNTER
Last appointment: 10/31/23  Next appointment: 4/26/24  Previous refill encounter(s): 10/18/22    Requested Prescriptions     Pending Prescriptions Disp Refills    sildenafil (VIAGRA) 100 MG tablet 30 tablet 4     Sig: TAKE ONE TABLET BY MOUTH DAILY AS NEEDED ( TAKE 30 TO 60 MINUTES PRIOR TO SEX ON EMPTY STOMACH)         For Pharmacy Admin Tracking Only    Program: Medication Refill  CPA in place:    Recommendation Provided To:    Intervention Detail: New Rx: 1, reason: Patient Preference  Intervention Accepted By:   Farzaneh Chamber Closed?:    Time Spent (min): 5

## 2023-12-23 DIAGNOSIS — I10 ESSENTIAL (PRIMARY) HYPERTENSION: ICD-10-CM

## 2023-12-26 NOTE — TELEPHONE ENCOUNTER
Last appointment: 10/31/23  Next appointment: 4/26/24  Previous refill encounter(s): 11/30/22    Requested Prescriptions     Pending Prescriptions Disp Refills    amLODIPine (NORVASC) 5 MG tablet [Pharmacy Med Name: AMLODIPINE BESYLATE 5 MG TAB] 30 tablet 0     Sig: TAKE 1 TABLET BY MOUTH [id number] Tracking Only    Program: Medication Refill  CPA in place:    Recommendation Provided To:    Intervention Detail: New Rx: 1, reason: Patient Preference  Intervention Accepted By:   Indy Vasquez Closed?:    Time Spent (min): 5

## 2023-12-27 RX ORDER — AMLODIPINE BESYLATE 5 MG/1
5 TABLET ORAL DAILY
Qty: 90 TABLET | Refills: 0 | Status: SHIPPED | OUTPATIENT
Start: 2023-12-27

## 2024-01-15 RX ORDER — ATORVASTATIN CALCIUM 20 MG/1
20 TABLET, FILM COATED ORAL DAILY
Qty: 90 TABLET | Refills: 3 | Status: SHIPPED | OUTPATIENT
Start: 2024-01-15

## 2024-03-29 DIAGNOSIS — I10 ESSENTIAL (PRIMARY) HYPERTENSION: ICD-10-CM

## 2024-03-29 RX ORDER — AMLODIPINE BESYLATE 5 MG/1
5 TABLET ORAL DAILY
Qty: 90 TABLET | Refills: 0 | Status: SHIPPED | OUTPATIENT
Start: 2024-03-29

## 2024-03-29 NOTE — TELEPHONE ENCOUNTER
Last appointment: 10/31/23  Next appointment: 6/13/24  Previous refill encounter(s): 12/27/23 #90    Requested Prescriptions     Pending Prescriptions Disp Refills    amLODIPine (NORVASC) 5 MG tablet [Pharmacy Med Name: AMLODIPINE BESYLATE 5 MG TAB] 30 tablet 0     Sig: TAKE 1 TABLET BY MOUTH EVERY DAY         For Pharmacy Admin Tracking Only    Program: Medication Refill  CPA in place:    Recommendation Provided To:   Intervention Detail: New Rx: 1, reason: Patient Preference  Intervention Accepted By:   Gap Closed?:    Time Spent (min): 5

## 2024-06-14 ENCOUNTER — TELEPHONE (OUTPATIENT)
Age: 59
End: 2024-06-14

## 2024-06-14 NOTE — TELEPHONE ENCOUNTER
Patient requesting an appointment for right foot pain and dry hard skin on foot. Patient can be reached at [phone].

## 2024-06-26 ENCOUNTER — OFFICE VISIT (OUTPATIENT)
Age: 59
End: 2024-06-26
Payer: COMMERCIAL

## 2024-06-26 VITALS
SYSTOLIC BLOOD PRESSURE: 147 MMHG | HEIGHT: 67 IN | HEART RATE: 64 BPM | BODY MASS INDEX: 26.46 KG/M2 | OXYGEN SATURATION: 97 % | DIASTOLIC BLOOD PRESSURE: 82 MMHG | WEIGHT: 168.6 LBS | RESPIRATION RATE: 18 BRPM | TEMPERATURE: 98.2 F

## 2024-06-26 DIAGNOSIS — L84 CALLUS OF TOE: ICD-10-CM

## 2024-06-26 DIAGNOSIS — L84 CALLUS OF FOOT: Primary | ICD-10-CM

## 2024-06-26 DIAGNOSIS — I10 ESSENTIAL (PRIMARY) HYPERTENSION: ICD-10-CM

## 2024-06-26 DIAGNOSIS — Z72.0 TOBACCO USE: ICD-10-CM

## 2024-06-26 DIAGNOSIS — M16.11 PRIMARY OSTEOARTHRITIS OF RIGHT HIP: ICD-10-CM

## 2024-06-26 PROCEDURE — 99213 OFFICE O/P EST LOW 20 MIN: CPT | Performed by: FAMILY MEDICINE

## 2024-06-26 PROCEDURE — 3017F COLORECTAL CA SCREEN DOC REV: CPT | Performed by: FAMILY MEDICINE

## 2024-06-26 PROCEDURE — 4004F PT TOBACCO SCREEN RCVD TLK: CPT | Performed by: FAMILY MEDICINE

## 2024-06-26 PROCEDURE — 3079F DIAST BP 80-89 MM HG: CPT | Performed by: FAMILY MEDICINE

## 2024-06-26 PROCEDURE — 3077F SYST BP >= 140 MM HG: CPT | Performed by: FAMILY MEDICINE

## 2024-06-26 PROCEDURE — G8419 CALC BMI OUT NRM PARAM NOF/U: HCPCS | Performed by: FAMILY MEDICINE

## 2024-06-26 PROCEDURE — G8427 DOCREV CUR MEDS BY ELIG CLIN: HCPCS | Performed by: FAMILY MEDICINE

## 2024-06-26 ASSESSMENT — PATIENT HEALTH QUESTIONNAIRE - PHQ9
SUM OF ALL RESPONSES TO PHQ QUESTIONS 1-9: 0
2. FEELING DOWN, DEPRESSED OR HOPELESS: NOT AT ALL
1. LITTLE INTEREST OR PLEASURE IN DOING THINGS: NOT AT ALL
SUM OF ALL RESPONSES TO PHQ9 QUESTIONS 1 & 2: 0
SUM OF ALL RESPONSES TO PHQ QUESTIONS 1-9: 0

## 2024-06-26 ASSESSMENT — ENCOUNTER SYMPTOMS: COLOR CHANGE: 0

## 2024-06-26 NOTE — PROGRESS NOTES
NAME:  Aamir Khoury   :   1965   MRN:   [id number]     Date/Time:  2024 12:53 PM  Subjective:c/o painfull calluses mrt lateral foot,l 3 rd toe tip   Skin Problem  This is a chronic problem. The problem has been gradually worsening since onset. The affected locations include the left toes and right foot. The rash is characterized by dryness.    Review of Systems   Musculoskeletal:  Positive for gait problem. Negative for arthralgias.   Skin:  Positive for rash and wound. Negative for color change.           Medications reviewed:  Current Outpatient Medications   Medication Sig    amLODIPine (NORVASC) 5 MG tablet TAKE 1 TABLET BY MOUTH EVERY DAY    atorvastatin (LIPITOR) 20 MG tablet TAKE 1 TABLET BY MOUTH EVERY DAY    sildenafil (VIAGRA) 100 MG tablet TAKE ONE TABLET BY MOUTH DAILY AS NEEDED ( TAKE 30 TO 60 MINUTES PRIOR TO SEX ON EMPTY STOMACH)    nicotine (NICODERM CQ) 14 MG/24HR APPLY 1 PATCH TRANSDERMALLY EVERY 24 HOURS    cilostazol (PLETAL) 100 MG tablet 1 TABLET 30 MINUTES BEFORE OR 2 HOURS AFTER BREAKFAST AND DINNER ORALLY TWICE A DAY 30 DAY(S)    aspirin 81 MG EC tablet Take 1 tablet by mouth daily (Patient not taking: Reported on 2024)     No current facility-administered medications for this visit.        Objective:   Vitals:  BP (!) 147/82 (Site: Left Upper Arm, Position: Sitting, Cuff Size: Large Adult)   Pulse 64   Temp 98.2 °F (36.8 °C) (Oral)   Resp 18   Ht 1.702 m (5' 7\")   Wt 76.5 kg (168 lb 9.6 oz)   SpO2 97%   BMI 26.41 kg/m²        PHYSICAL EXAM:  General:     Alert, cooperative, no distress, appears stated age.     Head:    Normocephalic, without obvious abnormality, atraumatic.  Ears:   External canals WNL TMs WNL   Eyes:    Conjunctivae/corneas clear.  PERRLA  Nose:   Nares normal. No drainage or sinus tenderness.  Throat:     Lips, mucosa, and tongue normal.  No Thrush  Skin:                2x2 cm rt lateral foot callus,l 3rd toe tip 1x1 cm callus;both

## 2024-06-26 NOTE — PROGRESS NOTES
Chief Complaint   Patient presents with    Foot Pain     Patient is here today for hard skin on his right foot that is very painful.      1. Have you been to the ER, urgent care clinic since your last visit?  Hospitalized since your last visit?No    2. Have you seen or consulted any other health care providers outside of the [facility] since your last visit?  Include any pap smears or colon screening. No

## 2024-06-27 DIAGNOSIS — I10 ESSENTIAL (PRIMARY) HYPERTENSION: ICD-10-CM

## 2024-06-27 RX ORDER — AMLODIPINE BESYLATE 5 MG/1
5 TABLET ORAL DAILY
Qty: 90 TABLET | Refills: 0 | Status: SHIPPED | OUTPATIENT
Start: 2024-06-27

## 2024-06-27 NOTE — TELEPHONE ENCOUNTER
Last appointment: 6/26/24  Next appointment: 11/14/24  Previous refill encounter(s): 3/29/24 #90    Requested Prescriptions     Pending Prescriptions Disp Refills    amLODIPine (NORVASC) 5 MG tablet [Pharmacy Med Name: AMLODIPINE BESYLATE 5 MG TAB] 90 tablet 0     Sig: TAKE 1 TABLET BY MOUTH EVERY DAY         For Pharmacy Admin Tracking Only    Program: Medication Refill  CPA in place:    Recommendation Provided To:   Intervention Detail: New Rx: 1, reason: Patient Preference  Intervention Accepted By:   Gap Closed?:    Time Spent (min): 5

## 2024-08-22 DIAGNOSIS — I10 ESSENTIAL (PRIMARY) HYPERTENSION: ICD-10-CM

## 2024-08-23 RX ORDER — AMLODIPINE BESYLATE 5 MG/1
5 TABLET ORAL DAILY
Qty: 30 TABLET | Refills: 0 | Status: SHIPPED | OUTPATIENT
Start: 2024-08-23

## 2024-08-23 NOTE — TELEPHONE ENCOUNTER
Last appointment: 6/26/24  Next appointment: 11/14/24  Previous refill encounter(s): 6/27/24 #90    Requested Prescriptions     Pending Prescriptions Disp Refills    amLODIPine (NORVASC) 5 MG tablet [Pharmacy Med Name: AMLODIPINE BESYLATE 5 MG TAB] 30 tablet 0     Sig: TAKE 1 TABLET BY MOUTH EVERY DAY         For Pharmacy Admin Tracking Only    Program: Medication Refill  CPA in place:    Recommendation Provided To:   Intervention Detail: New Rx: 1, reason: Patient Preference  Intervention Accepted By:   Gap Closed?:    Time Spent (min): 5

## 2024-08-26 ENCOUNTER — TELEPHONE (OUTPATIENT)
Age: 59
End: 2024-08-26

## 2024-08-26 NOTE — TELEPHONE ENCOUNTER
Patient contacted and stated he has been having tingling in his upper extremities for a while now. Rates pain 7/10 patient made aware provider is out of the office thius week and was advised to go to Urgent Care to be evaluated. Patient voiced understanding.

## 2024-08-26 NOTE — TELEPHONE ENCOUNTER
----- Message from Eusebio VALDES sent at 8/26/2024 10:42 AM EDT -----  Regarding: ECC Escalation To Practice  ECC Escalation To Practice      Type of Escalation: Red Flag Symptom  --------------------------------------------------------------------------------------------------------------------------    Information for Provider:Karen Neal APRN - NP  Patient is looking for appointment for: Symptom numbness  Reasons for Message: Unable to reach practice     Additional Information patient experiencing numbness,tingling,not feeling a body part  --------------------------------------------------------------------------------------------------------------------------    Relationship to Patient: Self     Call Back Info: OK to leave message on voicemail  Preferred Call Back Number: Phone [phone]

## 2024-09-19 DIAGNOSIS — I10 ESSENTIAL (PRIMARY) HYPERTENSION: ICD-10-CM

## 2024-09-19 RX ORDER — AMLODIPINE BESYLATE 5 MG/1
5 TABLET ORAL DAILY
Qty: 90 TABLET | Refills: 0 | Status: SHIPPED | OUTPATIENT
Start: 2024-09-19